# Patient Record
Sex: MALE | Race: WHITE | URBAN - METROPOLITAN AREA
[De-identification: names, ages, dates, MRNs, and addresses within clinical notes are randomized per-mention and may not be internally consistent; named-entity substitution may affect disease eponyms.]

---

## 2017-03-01 ENCOUNTER — HISTORICAL (OUTPATIENT)
Dept: LAB | Facility: HOSPITAL | Age: 82
End: 2017-03-01

## 2017-06-16 ENCOUNTER — HISTORICAL (OUTPATIENT)
Dept: ADMINISTRATIVE | Facility: HOSPITAL | Age: 82
End: 2017-06-16

## 2017-06-28 ENCOUNTER — HISTORICAL (OUTPATIENT)
Dept: ADMINISTRATIVE | Facility: HOSPITAL | Age: 82
End: 2017-06-28

## 2017-11-07 ENCOUNTER — HISTORICAL (OUTPATIENT)
Dept: LAB | Facility: HOSPITAL | Age: 82
End: 2017-11-07

## 2017-11-09 LAB — FINAL CULTURE: NORMAL

## 2018-02-02 ENCOUNTER — HISTORICAL (OUTPATIENT)
Dept: LAB | Facility: HOSPITAL | Age: 83
End: 2018-02-02

## 2018-02-04 LAB — FINAL CULTURE: NORMAL

## 2018-02-14 ENCOUNTER — HISTORICAL (OUTPATIENT)
Dept: LAB | Facility: HOSPITAL | Age: 83
End: 2018-02-14

## 2018-02-14 LAB
ABS NEUT (OLG): 3.3 X10(3)/MCL (ref 2.1–9.2)
ALBUMIN SERPL-MCNC: 3.5 GM/DL (ref 3.4–5)
ALBUMIN/GLOB SERPL: 1.1 RATIO (ref 1.1–2)
ALP SERPL-CCNC: 65 UNIT/L (ref 46–116)
ALT SERPL-CCNC: 19 UNIT/L (ref 12–78)
AST SERPL-CCNC: 15 UNIT/L (ref 15–37)
BASOPHILS NFR BLD AUTO: 1 % (ref 0–2)
BILIRUB SERPL-MCNC: 0.6 MG/DL (ref 0.2–1)
BILIRUBIN DIRECT+TOT PNL SERPL-MCNC: 0.2 MG/DL (ref 0–0.2)
BILIRUBIN DIRECT+TOT PNL SERPL-MCNC: 0.4 MG/DL (ref 0–0.8)
BUN SERPL-MCNC: 17.8 MG/DL (ref 7–18)
CALCIUM SERPL-MCNC: 9.5 MG/DL (ref 8.5–10.1)
CHLORIDE SERPL-SCNC: 109 MMOL/L (ref 98–107)
CO2 SERPL-SCNC: 29.3 MMOL/L (ref 21–32)
CREAT SERPL-MCNC: 1.04 MG/DL (ref 0.6–1.3)
CRP SERPL-MCNC: <0.2 MG/DL (ref 0–0.9)
EOSINOPHIL # BLD AUTO: 0.1 X10(3)/MCL
EOSINOPHIL NFR BLD AUTO: 2 %
ERYTHROCYTE [DISTWIDTH] IN BLOOD BY AUTOMATED COUNT: 14.1 % (ref 11.5–17)
GLOBULIN SER-MCNC: 3.2 GM/DL (ref 2.4–3.5)
GLUCOSE SERPL-MCNC: 112 MG/DL (ref 74–106)
HCT VFR BLD AUTO: 43.7 % (ref 42–52)
HGB BLD-MCNC: 14.6 GM/DL (ref 14–18)
LYMPHOCYTES # BLD AUTO: 0.9 X10(3)/MCL
LYMPHOCYTES NFR BLD AUTO: 18 % (ref 13–40)
MCH RBC QN AUTO: 31.2 PG (ref 27–31)
MCHC RBC AUTO-ENTMCNC: 33.3 GM/DL (ref 33–36)
MCV RBC AUTO: 93.5 FL (ref 80–94)
MONOCYTES # BLD AUTO: 0.3 X10(3)/MCL
MONOCYTES NFR BLD AUTO: 7 % (ref 2–11)
NEUTROPHILS # BLD AUTO: 3.3 X10(3)/MCL (ref 2.1–9.2)
NEUTROPHILS NFR BLD AUTO: 72 % (ref 47–80)
PLATELET # BLD AUTO: 177 X10(3)/MCL (ref 130–400)
PMV BLD AUTO: 8.5 FL (ref 7.4–10.4)
POTASSIUM SERPL-SCNC: 4.3 MMOL/L (ref 3.5–5.1)
PROT SERPL-MCNC: 6.7 GM/DL (ref 6.4–8.2)
RBC # BLD AUTO: 4.68 X10(6)/MCL (ref 4.7–6.1)
SODIUM SERPL-SCNC: 143 MMOL/L (ref 136–145)
TSH SERPL-ACNC: 2.1 MIU/ML (ref 0.36–3.74)
WBC # SPEC AUTO: 4.7 X10(3)/MCL (ref 4.5–11.5)

## 2018-03-13 ENCOUNTER — HISTORICAL (OUTPATIENT)
Dept: LAB | Facility: HOSPITAL | Age: 83
End: 2018-03-13

## 2018-03-13 LAB
ALBUMIN SERPL-MCNC: 3.4 GM/DL (ref 3.4–5)
ALP SERPL-CCNC: 70 UNIT/L (ref 46–116)
ALT SERPL-CCNC: 17 UNIT/L (ref 12–78)
AST SERPL-CCNC: 16 UNIT/L (ref 15–37)
BILIRUB SERPL-MCNC: 0.6 MG/DL (ref 0.2–1)
BILIRUBIN DIRECT+TOT PNL SERPL-MCNC: 0.16 MG/DL (ref 0–0.2)
BILIRUBIN DIRECT+TOT PNL SERPL-MCNC: 0.41 MG/DL (ref 0–0.8)
BUN SERPL-MCNC: 9.8 MG/DL (ref 7–18)
CALCIUM SERPL-MCNC: 9.3 MG/DL (ref 8.5–10.1)
CHLORIDE SERPL-SCNC: 107 MMOL/L (ref 98–107)
CHOLEST SERPL-MCNC: 149 MG/DL (ref 0–200)
CHOLEST/HDLC SERPL: 1.8 {RATIO} (ref 0–5)
CO2 SERPL-SCNC: 29.4 MMOL/L (ref 21–32)
CREAT SERPL-MCNC: 0.94 MG/DL (ref 0.6–1.3)
CREAT/UREA NIT SERPL: 10
ERYTHROCYTE [DISTWIDTH] IN BLOOD BY AUTOMATED COUNT: 13.8 % (ref 11.5–17)
EST. AVERAGE GLUCOSE BLD GHB EST-MCNC: 117 MG/DL
FT4I SERPL CALC-MCNC: 2.24
GLUCOSE SERPL-MCNC: 107 MG/DL (ref 74–106)
HBA1C MFR BLD: 5.7 % (ref 4.5–6.2)
HCT VFR BLD AUTO: 47.1 % (ref 42–52)
HDLC SERPL-MCNC: 85 MG/DL (ref 40–60)
HGB BLD-MCNC: 15.2 GM/DL (ref 14–18)
LDLC SERPL CALC-MCNC: 54 MG/DL (ref 0–129)
MCH RBC QN AUTO: 30.1 PG (ref 27–31)
MCHC RBC AUTO-ENTMCNC: 32.3 GM/DL (ref 33–36)
MCV RBC AUTO: 93.4 FL (ref 80–94)
PLATELET # BLD AUTO: 182 X10(3)/MCL (ref 130–400)
PMV BLD AUTO: 9.5 FL (ref 7.4–10.4)
POTASSIUM SERPL-SCNC: 4.1 MMOL/L (ref 3.5–5.1)
PROT SERPL-MCNC: 6.9 GM/DL (ref 6.4–8.2)
PSA SERPL-MCNC: <0.01 NG/ML (ref 0–4)
RBC # BLD AUTO: 5.05 X10(6)/MCL (ref 4.7–6.1)
SODIUM SERPL-SCNC: 142 MMOL/L (ref 136–145)
T3RU NFR SERPL: 34 % (ref 31–39)
T4 SERPL-MCNC: 6.6 MCG/DL (ref 4.7–13.3)
TRIGL SERPL-MCNC: 50 MG/DL
TSH SERPL-ACNC: 2.32 MIU/ML (ref 0.36–3.74)
VLDLC SERPL CALC-MCNC: 10 MG/DL
WBC # SPEC AUTO: 5.5 X10(3)/MCL (ref 4.5–11.5)

## 2018-03-22 ENCOUNTER — HISTORICAL (OUTPATIENT)
Dept: ENDOSCOPY | Facility: HOSPITAL | Age: 83
End: 2018-03-22

## 2018-10-18 ENCOUNTER — HISTORICAL (OUTPATIENT)
Dept: RADIOLOGY | Facility: HOSPITAL | Age: 83
End: 2018-10-18

## 2018-10-18 LAB
ALBUMIN SERPL-MCNC: 3 GM/DL (ref 3.4–5)
ALBUMIN/GLOB SERPL: 0.8 RATIO (ref 1.1–2)
ALP SERPL-CCNC: 108 UNIT/L (ref 46–116)
ALT SERPL-CCNC: 51 UNIT/L (ref 12–78)
AMYLASE SERPL-CCNC: 260 UNIT/L (ref 25–115)
AST SERPL-CCNC: 71 UNIT/L (ref 15–37)
BILIRUB SERPL-MCNC: 1.9 MG/DL (ref 0.2–1)
BILIRUBIN DIRECT+TOT PNL SERPL-MCNC: 0.78 MG/DL (ref 0–0.8)
BILIRUBIN DIRECT+TOT PNL SERPL-MCNC: 1.12 MG/DL (ref 0–0.2)
BUN SERPL-MCNC: 16.1 MG/DL (ref 7–18)
CALCIUM SERPL-MCNC: 8.6 MG/DL (ref 8.5–10.1)
CHLORIDE SERPL-SCNC: 102 MMOL/L (ref 98–107)
CO2 SERPL-SCNC: 27.7 MMOL/L (ref 21–32)
CREAT SERPL-MCNC: 1.04 MG/DL (ref 0.6–1.3)
ERYTHROCYTE [DISTWIDTH] IN BLOOD BY AUTOMATED COUNT: 13.7 % (ref 11.5–17)
GLOBULIN SER-MCNC: 3.6 GM/DL (ref 2.4–3.5)
GLUCOSE SERPL-MCNC: 114 MG/DL (ref 74–106)
HCT VFR BLD AUTO: 42.6 % (ref 42–52)
HGB BLD-MCNC: 14.2 GM/DL (ref 14–18)
MCH RBC QN AUTO: 30.6 PG (ref 27–31)
MCHC RBC AUTO-ENTMCNC: 33.3 GM/DL (ref 33–36)
MCV RBC AUTO: 91.8 FL (ref 80–94)
PLATELET # BLD AUTO: 195 X10(3)/MCL (ref 130–400)
PMV BLD AUTO: 11.4 FL (ref 9.4–12.4)
POTASSIUM SERPL-SCNC: 3.6 MMOL/L (ref 3.5–5.1)
PROT SERPL-MCNC: 6.6 GM/DL (ref 6.4–8.2)
RBC # BLD AUTO: 4.64 X10(6)/MCL (ref 4.7–6.1)
SODIUM SERPL-SCNC: 140 MMOL/L (ref 136–145)
WBC # SPEC AUTO: 9.4 X10(3)/MCL (ref 4.5–11.5)

## 2019-04-17 ENCOUNTER — HISTORICAL (OUTPATIENT)
Dept: ENDOSCOPY | Facility: HOSPITAL | Age: 84
End: 2019-04-17

## 2019-05-01 ENCOUNTER — HISTORICAL (OUTPATIENT)
Dept: LAB | Facility: HOSPITAL | Age: 84
End: 2019-05-01

## 2019-05-01 LAB
ABS NEUT (OLG): 2.75 X10(3)/MCL (ref 2.1–9.2)
ALBUMIN SERPL-MCNC: 3.2 GM/DL (ref 3.4–5)
ALP SERPL-CCNC: 74 UNIT/L (ref 46–116)
ALT SERPL-CCNC: 14 UNIT/L (ref 12–78)
AST SERPL-CCNC: 17 UNIT/L (ref 15–37)
BILIRUB SERPL-MCNC: 0.7 MG/DL (ref 0.2–1)
BILIRUBIN DIRECT+TOT PNL SERPL-MCNC: 0.19 MG/DL (ref 0–0.2)
BILIRUBIN DIRECT+TOT PNL SERPL-MCNC: 0.51 MG/DL (ref 0–0.8)
BUN SERPL-MCNC: 16.8 MG/DL (ref 7–18)
CALCIUM SERPL-MCNC: 9.1 MG/DL (ref 8.5–10.1)
CHLORIDE SERPL-SCNC: 105 MMOL/L (ref 98–107)
CHOLEST SERPL-MCNC: 167 MG/DL (ref 0–200)
CHOLEST/HDLC SERPL: 2.8 {RATIO} (ref 0–5)
CO2 SERPL-SCNC: 31.9 MMOL/L (ref 21–32)
CREAT SERPL-MCNC: 0.98 MG/DL (ref 0.6–1.3)
CREAT/UREA NIT SERPL: 17
ERYTHROCYTE [DISTWIDTH] IN BLOOD BY AUTOMATED COUNT: 14.3 % (ref 11.5–17)
FT4I SERPL CALC-MCNC: 2.07
GLUCOSE SERPL-MCNC: 99 MG/DL (ref 74–106)
HCT VFR BLD AUTO: 41 % (ref 42–52)
HDLC SERPL-MCNC: 59 MG/DL (ref 40–60)
HGB BLD-MCNC: 13.9 GM/DL (ref 14–18)
LDLC SERPL CALC-MCNC: 102 MG/DL (ref 0–129)
LYMPHOCYTES NFR BLD MANUAL: NORMAL % (ref 13–40)
MCH RBC QN AUTO: 31 PG (ref 27–31)
MCHC RBC AUTO-ENTMCNC: 33.9 GM/DL (ref 33–36)
MCV RBC AUTO: 91.3 FL (ref 80–94)
PLATELET # BLD AUTO: 179 X10(3)/MCL (ref 130–400)
PMV BLD AUTO: 10.3 FL (ref 9.4–12.4)
POTASSIUM SERPL-SCNC: 4.6 MMOL/L (ref 3.5–5.1)
PROT SERPL-MCNC: 6.6 GM/DL (ref 6.4–8.2)
PSA SERPL-MCNC: <0.01 NG/ML (ref 0–4)
RBC # BLD AUTO: 4.49 X10(6)/MCL (ref 4.7–6.1)
SODIUM SERPL-SCNC: 141 MMOL/L (ref 136–145)
T3RU NFR SERPL: 34 % (ref 31–39)
T4 SERPL-MCNC: 6.1 MCG/DL (ref 4.7–13.3)
TRIGL SERPL-MCNC: 31 MG/DL
TSH SERPL-ACNC: 2.19 MIU/ML (ref 0.36–3.74)
VLDLC SERPL CALC-MCNC: 6 MG/DL
WBC # SPEC AUTO: 4.2 X10(3)/MCL (ref 4.5–11.5)

## 2019-08-01 ENCOUNTER — HISTORICAL (OUTPATIENT)
Dept: LAB | Facility: HOSPITAL | Age: 84
End: 2019-08-01

## 2020-04-24 ENCOUNTER — HISTORICAL (OUTPATIENT)
Dept: RADIOLOGY | Facility: HOSPITAL | Age: 85
End: 2020-04-24

## 2020-04-24 LAB
BUN SERPL-MCNC: 14.8 MG/DL (ref 7–18)
CALCIUM SERPL-MCNC: 9.3 MG/DL (ref 8.5–10.1)
CHLORIDE SERPL-SCNC: 106 MMOL/L (ref 98–107)
CO2 SERPL-SCNC: 33.7 MMOL/L (ref 21–32)
CREAT SERPL-MCNC: 0.84 MG/DL (ref 0.6–1.3)
CREAT/UREA NIT SERPL: 18
ERYTHROCYTE [DISTWIDTH] IN BLOOD BY AUTOMATED COUNT: 14.5 % (ref 11.5–17)
GLUCOSE SERPL-MCNC: 91 MG/DL (ref 74–106)
HCT VFR BLD AUTO: 38.5 % (ref 42–52)
HGB BLD-MCNC: 12.4 GM/DL (ref 14–18)
MCH RBC QN AUTO: 30.5 PG (ref 27–31)
MCHC RBC AUTO-ENTMCNC: 32.2 GM/DL (ref 33–36)
MCV RBC AUTO: 94.6 FL (ref 80–94)
PLATELET # BLD AUTO: 211 X10(3)/MCL (ref 130–400)
PMV BLD AUTO: 10.8 FL (ref 9.4–12.4)
POTASSIUM SERPL-SCNC: 4.5 MMOL/L (ref 3.5–5.1)
RBC # BLD AUTO: 4.07 X10(6)/MCL (ref 4.7–6.1)
SODIUM SERPL-SCNC: 145 MMOL/L (ref 136–145)
WBC # SPEC AUTO: 4.4 X10(3)/MCL (ref 4.5–11.5)

## 2020-05-21 ENCOUNTER — HISTORICAL (OUTPATIENT)
Dept: ADMINISTRATIVE | Facility: HOSPITAL | Age: 85
End: 2020-05-21

## 2020-05-21 LAB
ABS NEUT (OLG): 14.65 X10(3)/MCL (ref 2.1–9.2)
BASOPHILS # BLD AUTO: 0.03 X10(3)/MCL (ref 0–0.2)
BASOPHILS NFR BLD AUTO: 0.2 % (ref 0–0.9)
BUN SERPL-MCNC: 27.5 MG/DL (ref 8.4–25.7)
CALCIUM SERPL-MCNC: 7.7 MG/DL (ref 8.8–10)
CHLORIDE SERPL-SCNC: 106 MMOL/L (ref 98–107)
CO2 SERPL-SCNC: 31 MMOL/L (ref 23–31)
CREAT SERPL-MCNC: 0.57 MG/DL (ref 0.72–1.25)
CREAT/UREA NIT SERPL: 48
EOSINOPHIL # BLD AUTO: 0.01 X10(3)/MCL (ref 0–0.9)
EOSINOPHIL NFR BLD AUTO: 0.1 % (ref 0–6.5)
ERYTHROCYTE [DISTWIDTH] IN BLOOD BY AUTOMATED COUNT: 14.7 % (ref 11.5–17)
GLUCOSE SERPL-MCNC: 210 MG/DL (ref 82–115)
HCO3 UR-SCNC: 34.3 MMOL/L (ref 22–26)
HCT VFR BLD AUTO: 22.5 % (ref 42–52)
HGB BLD-MCNC: 7.3 GM/DL (ref 14–18)
IMM GRANULOCYTES # BLD AUTO: 0.11 10*3/UL (ref 0–0.02)
IMM GRANULOCYTES NFR BLD AUTO: 0.7 % (ref 0–0.43)
LYMPHOCYTES # BLD AUTO: 0.42 X10(3)/MCL (ref 0.6–4.6)
LYMPHOCYTES NFR BLD AUTO: 2.7 % (ref 16.2–38.3)
MCH RBC QN AUTO: 29.1 PG (ref 27–31)
MCHC RBC AUTO-ENTMCNC: 32.4 GM/DL (ref 33–36)
MCV RBC AUTO: 89.6 FL (ref 80–94)
MONOCYTES # BLD AUTO: 0.5 X10(3)/MCL (ref 0.1–1.3)
MONOCYTES NFR BLD AUTO: 3.2 % (ref 4.7–11.3)
NEUTROPHILS # BLD AUTO: 14.65 X10(3)/MCL (ref 2.1–9.2)
NEUTROPHILS NFR BLD AUTO: 93.1 % (ref 49.1–73.4)
NRBC BLD AUTO-RTO: 0 % (ref 0–0.2)
PCO2 BLDA: 41 MMHG (ref 35–45)
PH SMN: 7.53 [PH] (ref 7.35–7.45)
PLATELET # BLD AUTO: 380 X10(3)/MCL (ref 130–400)
PMV BLD AUTO: 10.1 FL (ref 7.4–10.4)
PO2 BLDA: 91 MMHG (ref 50–100)
POC BE: 10.6 (ref -2–3)
POC SATURATED O2: 98 % (ref 96–97)
POTASSIUM SERPL-SCNC: 3.7 MMOL/L (ref 3.5–5.1)
PREALB SERPL-MCNC: 5.5 MG/DL (ref 16–42)
RBC # BLD AUTO: 2.51 X10(6)/MCL (ref 4.7–6.1)
SETTING 1 ABG: ABNORMAL
SETTING 2 ABG: ABNORMAL
SODIUM SERPL-SCNC: 142 MMOL/L (ref 136–145)
WBC # SPEC AUTO: 15.7 X10(3)/MCL (ref 4.5–11.5)

## 2020-05-22 LAB
ABS NEUT (OLG): 13.66 X10(3)/MCL (ref 2.1–9.2)
ALBUMIN SERPL-MCNC: 1.3 GM/DL (ref 3.4–4.8)
APPEARANCE, UA: ABNORMAL
BACTERIA SPEC CULT: ABNORMAL /HPF
BILIRUB UR QL STRIP: NEGATIVE
BUN SERPL-MCNC: 24.4 MG/DL (ref 8.4–25.7)
CALCIUM SERPL-MCNC: 8.4 MG/DL (ref 8.8–10)
CHLORIDE SERPL-SCNC: 107 MMOL/L (ref 98–107)
CO2 SERPL-SCNC: 32 MMOL/L (ref 23–31)
COLOR UR: YELLOW
CREAT SERPL-MCNC: 0.58 MG/DL (ref 0.72–1.25)
CROSSMATCH INTERPRETATION: NORMAL
CROSSMATCH INTERPRETATION: NORMAL
ERYTHROCYTE [DISTWIDTH] IN BLOOD BY AUTOMATED COUNT: 15 % (ref 11.5–17)
GLUCOSE (UA): NEGATIVE
GLUCOSE SERPL-MCNC: 179 MG/DL (ref 82–115)
GROUP & RH: NORMAL
HCT VFR BLD AUTO: 21.7 % (ref 42–52)
HGB BLD-MCNC: 6.9 GM/DL (ref 14–18)
HGB UR QL STRIP: ABNORMAL
KETONES UR QL STRIP: NEGATIVE
LEUKOCYTE ESTERASE UR QL STRIP: ABNORMAL
MAGNESIUM SERPL-MCNC: 2.25 MG/DL (ref 1.6–2.6)
MCH RBC QN AUTO: 29 PG (ref 27–31)
MCHC RBC AUTO-ENTMCNC: 31.8 GM/DL (ref 33–36)
MCV RBC AUTO: 91.2 FL (ref 80–94)
NITRITE UR QL STRIP: POSITIVE
NRBC BLD AUTO-RTO: 0 % (ref 0–0.2)
PH UR STRIP: 6.5 [PH] (ref 5–7)
PHOSPHATE SERPL-MCNC: 2 MG/DL (ref 2.3–4.7)
PLATELET # BLD AUTO: 419 X10(3)/MCL (ref 130–400)
PMV BLD AUTO: 10.3 FL (ref 7.4–10.4)
POTASSIUM SERPL-SCNC: 3.8 MMOL/L (ref 3.5–5.1)
PRODUCT READY: NORMAL
PRODUCT READY: NORMAL
PROT UR QL STRIP: NEGATIVE
RBC # BLD AUTO: 2.38 X10(6)/MCL (ref 4.7–6.1)
RBC #/AREA URNS HPF: ABNORMAL /HPF
SODIUM SERPL-SCNC: 143 MMOL/L (ref 136–145)
SP GR UR STRIP: 1.02 (ref 1–1.03)
SQUAMOUS EPITHELIAL, UA: ABNORMAL /LPF
TRANSFUSION ORDER: NORMAL
TRANSFUSION ORDER: NORMAL
UROBILINOGEN UR STRIP-ACNC: NEGATIVE
WBC # SPEC AUTO: 14.7 X10(3)/MCL (ref 4.5–11.5)
WBC #/AREA URNS HPF: ABNORMAL /HPF

## 2020-05-23 LAB
ABS NEUT (OLG): 10.8 X10(3)/MCL (ref 2.1–9.2)
ALBUMIN SERPL-MCNC: 1.4 GM/DL (ref 3.4–4.8)
BUN SERPL-MCNC: 29.9 MG/DL (ref 8.4–25.7)
CALCIUM SERPL-MCNC: 8.7 MG/DL (ref 8.8–10)
CHLORIDE SERPL-SCNC: 104 MMOL/L (ref 98–107)
CO2 SERPL-SCNC: 35 MMOL/L (ref 23–31)
CREAT SERPL-MCNC: 0.56 MG/DL (ref 0.72–1.25)
ERYTHROCYTE [DISTWIDTH] IN BLOOD BY AUTOMATED COUNT: 14.7 % (ref 11.5–17)
GLUCOSE SERPL-MCNC: 182 MG/DL (ref 82–115)
GRAM STN SPEC: NORMAL
HCT VFR BLD AUTO: 26.5 % (ref 42–52)
HGB BLD-MCNC: 8.8 GM/DL (ref 14–18)
MAGNESIUM SERPL-MCNC: 2.23 MG/DL (ref 1.6–2.6)
MCH RBC QN AUTO: 29.9 PG (ref 27–31)
MCHC RBC AUTO-ENTMCNC: 33.2 GM/DL (ref 33–36)
MCV RBC AUTO: 90.1 FL (ref 80–94)
NRBC BLD AUTO-RTO: 0 % (ref 0–0.2)
PHOSPHATE SERPL-MCNC: 2.7 MG/DL (ref 2.3–4.7)
PLATELET # BLD AUTO: 433 X10(3)/MCL (ref 130–400)
PMV BLD AUTO: 10.3 FL (ref 7.4–10.4)
POTASSIUM SERPL-SCNC: 4 MMOL/L (ref 3.5–5.1)
RBC # BLD AUTO: 2.94 X10(6)/MCL (ref 4.7–6.1)
SODIUM SERPL-SCNC: 142 MMOL/L (ref 136–145)
WBC # SPEC AUTO: 11.8 X10(3)/MCL (ref 4.5–11.5)

## 2020-05-24 LAB
ABS NEUT (OLG): 14.18 X10(3)/MCL (ref 2.1–9.2)
ALBUMIN SERPL-MCNC: 1.3 GM/DL (ref 3.4–4.8)
BUN SERPL-MCNC: 49.5 MG/DL (ref 8.4–25.7)
CALCIUM SERPL-MCNC: 8.5 MG/DL (ref 8.8–10)
CHLORIDE SERPL-SCNC: 107 MMOL/L (ref 98–107)
CO2 SERPL-SCNC: 31 MMOL/L (ref 23–31)
CREAT SERPL-MCNC: 0.59 MG/DL (ref 0.72–1.25)
ERYTHROCYTE [DISTWIDTH] IN BLOOD BY AUTOMATED COUNT: 15.2 % (ref 11.5–17)
FINAL CULTURE: NORMAL
GLUCOSE SERPL-MCNC: 248 MG/DL (ref 82–115)
HCT VFR BLD AUTO: 21.4 % (ref 42–52)
HGB BLD-MCNC: 7 GM/DL (ref 14–18)
MAGNESIUM SERPL-MCNC: 2.17 MG/DL (ref 1.6–2.6)
MCH RBC QN AUTO: 30.3 PG (ref 27–31)
MCHC RBC AUTO-ENTMCNC: 32.7 GM/DL (ref 33–36)
MCV RBC AUTO: 92.6 FL (ref 80–94)
NRBC BLD AUTO-RTO: 0 % (ref 0–0.2)
PHOSPHATE SERPL-MCNC: 2.4 MG/DL (ref 2.3–4.7)
PLATELET # BLD AUTO: 488 X10(3)/MCL (ref 130–400)
PMV BLD AUTO: 11 FL (ref 7.4–10.4)
POTASSIUM SERPL-SCNC: 4.2 MMOL/L (ref 3.5–5.1)
RBC # BLD AUTO: 2.31 X10(6)/MCL (ref 4.7–6.1)
SODIUM SERPL-SCNC: 144 MMOL/L (ref 136–145)
WBC # SPEC AUTO: 14.9 X10(3)/MCL (ref 4.5–11.5)

## 2020-05-25 LAB
ABS NEUT (OLG): 15.05 X10(3)/MCL (ref 2.1–9.2)
ALBUMIN SERPL-MCNC: 1.3 GM/DL (ref 3.4–4.8)
BASOPHILS # BLD AUTO: 0.02 X10(3)/MCL (ref 0–0.2)
BASOPHILS NFR BLD AUTO: 0.1 % (ref 0–0.9)
BUN SERPL-MCNC: 62.8 MG/DL (ref 8.4–25.7)
CALCIUM SERPL-MCNC: 8.4 MG/DL (ref 8.8–10)
CHLORIDE SERPL-SCNC: 111 MMOL/L (ref 98–107)
CO2 SERPL-SCNC: 30 MMOL/L (ref 23–31)
CREAT SERPL-MCNC: 0.78 MG/DL (ref 0.72–1.25)
CROSSMATCH INTERPRETATION: NORMAL
EOSINOPHIL # BLD AUTO: 0 X10(3)/MCL (ref 0–0.9)
EOSINOPHIL NFR BLD AUTO: 0 % (ref 0–6.5)
ERYTHROCYTE [DISTWIDTH] IN BLOOD BY AUTOMATED COUNT: 15.8 % (ref 11.5–17)
GLUCOSE SERPL-MCNC: 280 MG/DL (ref 82–115)
GROUP & RH: NORMAL
HCT VFR BLD AUTO: 22.2 % (ref 42–52)
HGB BLD-MCNC: 7.5 GM/DL (ref 14–18)
IMM GRANULOCYTES # BLD AUTO: 0.12 10*3/UL (ref 0–0.02)
IMM GRANULOCYTES NFR BLD AUTO: 0.7 % (ref 0–0.43)
LYMPHOCYTES # BLD AUTO: 0.65 X10(3)/MCL (ref 0.6–4.6)
LYMPHOCYTES NFR BLD AUTO: 4 % (ref 16.2–38.3)
MAGNESIUM SERPL-MCNC: 2.37 MG/DL (ref 1.6–2.6)
MCH RBC QN AUTO: 30.4 PG (ref 27–31)
MCHC RBC AUTO-ENTMCNC: 33.8 GM/DL (ref 33–36)
MCV RBC AUTO: 89.9 FL (ref 80–94)
MONOCYTES # BLD AUTO: 0.51 X10(3)/MCL (ref 0.1–1.3)
MONOCYTES NFR BLD AUTO: 3.1 % (ref 4.7–11.3)
NEUTROPHILS # BLD AUTO: 15.05 X10(3)/MCL (ref 2.1–9.2)
NEUTROPHILS NFR BLD AUTO: 92.1 % (ref 49.1–73.4)
NRBC BLD AUTO-RTO: 0 % (ref 0–0.2)
PHOSPHATE SERPL-MCNC: 2.3 MG/DL (ref 2.3–4.7)
PLATELET # BLD AUTO: 376 X10(3)/MCL (ref 130–400)
PMV BLD AUTO: 10.9 FL (ref 7.4–10.4)
POTASSIUM SERPL-SCNC: 4.3 MMOL/L (ref 3.5–5.1)
PRODUCT READY: NORMAL
RBC # BLD AUTO: 2.47 X10(6)/MCL (ref 4.7–6.1)
SODIUM SERPL-SCNC: 146 MMOL/L (ref 136–145)
TRANSFUSION ORDER: NORMAL
VANCOMYCIN TROUGH SERPL-MCNC: 9.7 UG/ML (ref 5–10)
WBC # SPEC AUTO: 16.4 X10(3)/MCL (ref 4.5–11.5)

## 2020-05-26 LAB
ABS NEUT (OLG): 13.83 X10(3)/MCL (ref 2.1–9.2)
ALBUMIN SERPL-MCNC: 1.3 GM/DL (ref 3.4–4.8)
BASOPHILS # BLD AUTO: 0.01 X10(3)/MCL (ref 0–0.2)
BASOPHILS NFR BLD AUTO: 0.1 % (ref 0–0.9)
BUN SERPL-MCNC: 58.1 MG/DL (ref 8.4–25.7)
CALCIUM SERPL-MCNC: 8 MG/DL (ref 8.8–10)
CHLORIDE SERPL-SCNC: 113 MMOL/L (ref 98–107)
CO2 SERPL-SCNC: 32 MMOL/L (ref 23–31)
CREAT SERPL-MCNC: 0.72 MG/DL (ref 0.72–1.25)
EOSINOPHIL # BLD AUTO: 0 X10(3)/MCL (ref 0–0.9)
EOSINOPHIL NFR BLD AUTO: 0 % (ref 0–6.5)
ERYTHROCYTE [DISTWIDTH] IN BLOOD BY AUTOMATED COUNT: 15.7 % (ref 11.5–17)
EST. AVERAGE GLUCOSE BLD GHB EST-MCNC: 108.3 MG/DL
GLUCOSE SERPL-MCNC: 247 MG/DL (ref 82–115)
HBA1C MFR BLD: 5.4 %
HCT VFR BLD AUTO: 26 % (ref 42–52)
HGB BLD-MCNC: 8.5 GM/DL (ref 14–18)
IMM GRANULOCYTES # BLD AUTO: 0.11 10*3/UL (ref 0–0.02)
IMM GRANULOCYTES NFR BLD AUTO: 0.7 % (ref 0–0.43)
LYMPHOCYTES # BLD AUTO: 0.49 X10(3)/MCL (ref 0.6–4.6)
LYMPHOCYTES NFR BLD AUTO: 3.3 % (ref 16.2–38.3)
MAGNESIUM SERPL-MCNC: 2.54 MG/DL (ref 1.6–2.6)
MCH RBC QN AUTO: 29.5 PG (ref 27–31)
MCHC RBC AUTO-ENTMCNC: 32.7 GM/DL (ref 33–36)
MCV RBC AUTO: 90.3 FL (ref 80–94)
MONOCYTES # BLD AUTO: 0.49 X10(3)/MCL (ref 0.1–1.3)
MONOCYTES NFR BLD AUTO: 3.3 % (ref 4.7–11.3)
NEUTROPHILS # BLD AUTO: 13.83 X10(3)/MCL (ref 2.1–9.2)
NEUTROPHILS NFR BLD AUTO: 92.6 % (ref 49.1–73.4)
NRBC BLD AUTO-RTO: 0 % (ref 0–0.2)
PHOSPHATE SERPL-MCNC: 2.5 MG/DL (ref 2.3–4.7)
PLATELET # BLD AUTO: 349 X10(3)/MCL (ref 130–400)
PMV BLD AUTO: 11.2 FL (ref 7.4–10.4)
POTASSIUM SERPL-SCNC: 4 MMOL/L (ref 3.5–5.1)
RBC # BLD AUTO: 2.88 X10(6)/MCL (ref 4.7–6.1)
SODIUM SERPL-SCNC: 150 MMOL/L (ref 136–145)
WBC # SPEC AUTO: 14.9 X10(3)/MCL (ref 4.5–11.5)

## 2020-05-27 LAB
ABS NEUT (OLG): 11.04 X10(3)/MCL (ref 2.1–9.2)
ALBUMIN SERPL-MCNC: 1.2 GM/DL (ref 3.4–4.8)
BUN SERPL-MCNC: 49 MG/DL (ref 8.4–25.7)
CALCIUM SERPL-MCNC: 8.5 MG/DL (ref 8.8–10)
CHLORIDE SERPL-SCNC: 114 MMOL/L (ref 98–107)
CO2 SERPL-SCNC: 35 MMOL/L (ref 23–31)
CREAT SERPL-MCNC: 0.64 MG/DL (ref 0.72–1.25)
ERYTHROCYTE [DISTWIDTH] IN BLOOD BY AUTOMATED COUNT: 15.6 % (ref 11.5–17)
GLUCOSE SERPL-MCNC: 174 MG/DL (ref 82–115)
HCT VFR BLD AUTO: 24.5 % (ref 42–52)
HGB BLD-MCNC: 7.9 GM/DL (ref 14–18)
MAGNESIUM SERPL-MCNC: 2.51 MG/DL (ref 1.6–2.6)
MCH RBC QN AUTO: 30.3 PG (ref 27–31)
MCHC RBC AUTO-ENTMCNC: 32.2 GM/DL (ref 33–36)
MCV RBC AUTO: 93.9 FL (ref 80–94)
NRBC BLD AUTO-RTO: 0 % (ref 0–0.2)
PHOSPHATE SERPL-MCNC: 2.4 MG/DL (ref 2.3–4.7)
PLATELET # BLD AUTO: 286 X10(3)/MCL (ref 130–400)
PMV BLD AUTO: 10.8 FL (ref 7.4–10.4)
POTASSIUM SERPL-SCNC: 3.5 MMOL/L (ref 3.5–5.1)
RBC # BLD AUTO: 2.61 X10(6)/MCL (ref 4.7–6.1)
SODIUM SERPL-SCNC: 150 MMOL/L (ref 136–145)
WBC # SPEC AUTO: 11.8 X10(3)/MCL (ref 4.5–11.5)

## 2020-05-28 LAB
ABS NEUT (OLG): 9.04 X10(3)/MCL (ref 2.1–9.2)
BASOPHILS # BLD AUTO: 0.01 X10(3)/MCL (ref 0–0.2)
BASOPHILS NFR BLD AUTO: 0.1 % (ref 0–0.9)
BUN SERPL-MCNC: 37.1 MG/DL (ref 8.4–25.7)
CALCIUM SERPL-MCNC: 8 MG/DL (ref 8.8–10)
CHLORIDE SERPL-SCNC: 114 MMOL/L (ref 98–107)
CO2 SERPL-SCNC: 32 MMOL/L (ref 23–31)
CREAT SERPL-MCNC: 0.58 MG/DL (ref 0.72–1.25)
CREAT/UREA NIT SERPL: 64
EOSINOPHIL # BLD AUTO: 0.07 X10(3)/MCL (ref 0–0.9)
EOSINOPHIL NFR BLD AUTO: 0.7 % (ref 0–6.5)
ERYTHROCYTE [DISTWIDTH] IN BLOOD BY AUTOMATED COUNT: 15.5 % (ref 11.5–17)
GLUCOSE SERPL-MCNC: 170 MG/DL (ref 82–115)
HCT VFR BLD AUTO: 25.3 % (ref 42–52)
HGB BLD-MCNC: 8.1 GM/DL (ref 14–18)
IMM GRANULOCYTES # BLD AUTO: 0.07 10*3/UL (ref 0–0.02)
IMM GRANULOCYTES NFR BLD AUTO: 0.7 % (ref 0–0.43)
LYMPHOCYTES # BLD AUTO: 0.41 X10(3)/MCL (ref 0.6–4.6)
LYMPHOCYTES NFR BLD AUTO: 4.1 % (ref 16.2–38.3)
MCH RBC QN AUTO: 30.2 PG (ref 27–31)
MCHC RBC AUTO-ENTMCNC: 32 GM/DL (ref 33–36)
MCV RBC AUTO: 94.4 FL (ref 80–94)
MONOCYTES # BLD AUTO: 0.3 X10(3)/MCL (ref 0.1–1.3)
MONOCYTES NFR BLD AUTO: 3 % (ref 4.7–11.3)
NEUTROPHILS # BLD AUTO: 9.04 X10(3)/MCL (ref 2.1–9.2)
NEUTROPHILS NFR BLD AUTO: 91.4 % (ref 49.1–73.4)
NRBC BLD AUTO-RTO: 0 % (ref 0–0.2)
PLATELET # BLD AUTO: 298 X10(3)/MCL (ref 130–400)
PMV BLD AUTO: 11.3 FL (ref 7.4–10.4)
POTASSIUM SERPL-SCNC: 3.5 MMOL/L (ref 3.5–5.1)
RBC # BLD AUTO: 2.68 X10(6)/MCL (ref 4.7–6.1)
SODIUM SERPL-SCNC: 150 MMOL/L (ref 136–145)
VANCOMYCIN TROUGH SERPL-MCNC: 15.2 UG/ML (ref 5–10)
WBC # SPEC AUTO: 9.9 X10(3)/MCL (ref 4.5–11.5)

## 2020-05-29 LAB
C DIFF INTERP: NEGATIVE
COLOR STL: ABNORMAL
CONSISTENCY STL: ABNORMAL
HEMOCCULT SP2 STL QL: POSITIVE

## 2020-05-30 LAB
ABS NEUT (OLG): 7.72 X10(3)/MCL (ref 2.1–9.2)
BASOPHILS # BLD AUTO: 0.01 X10(3)/MCL (ref 0–0.2)
BASOPHILS NFR BLD AUTO: 0.1 % (ref 0–0.9)
BUN SERPL-MCNC: 25 MG/DL (ref 8.4–25.7)
CALCIUM SERPL-MCNC: 7.5 MG/DL (ref 8.8–10)
CHLORIDE SERPL-SCNC: 114 MMOL/L (ref 98–107)
CO2 SERPL-SCNC: 29 MMOL/L (ref 23–31)
CREAT SERPL-MCNC: 0.5 MG/DL (ref 0.72–1.25)
CREAT/UREA NIT SERPL: 50
EOSINOPHIL # BLD AUTO: 0.23 X10(3)/MCL (ref 0–0.9)
EOSINOPHIL NFR BLD AUTO: 2.6 % (ref 0–6.5)
ERYTHROCYTE [DISTWIDTH] IN BLOOD BY AUTOMATED COUNT: 15.7 % (ref 11.5–17)
GLUCOSE SERPL-MCNC: 137 MG/DL (ref 82–115)
HCT VFR BLD AUTO: 24.1 % (ref 42–52)
HGB BLD-MCNC: 7.5 GM/DL (ref 14–18)
IMM GRANULOCYTES # BLD AUTO: 0.05 10*3/UL (ref 0–0.02)
IMM GRANULOCYTES NFR BLD AUTO: 0.6 % (ref 0–0.43)
LYMPHOCYTES # BLD AUTO: 0.41 X10(3)/MCL (ref 0.6–4.6)
LYMPHOCYTES NFR BLD AUTO: 4.7 % (ref 16.2–38.3)
MAGNESIUM SERPL-MCNC: 2.17 MG/DL (ref 1.6–2.6)
MCH RBC QN AUTO: 29.6 PG (ref 27–31)
MCHC RBC AUTO-ENTMCNC: 31.1 GM/DL (ref 33–36)
MCV RBC AUTO: 95.3 FL (ref 80–94)
MONOCYTES # BLD AUTO: 0.3 X10(3)/MCL (ref 0.1–1.3)
MONOCYTES NFR BLD AUTO: 3.4 % (ref 4.7–11.3)
NEUTROPHILS # BLD AUTO: 7.72 X10(3)/MCL (ref 2.1–9.2)
NEUTROPHILS NFR BLD AUTO: 88.6 % (ref 49.1–73.4)
NRBC BLD AUTO-RTO: 0 % (ref 0–0.2)
PLATELET # BLD AUTO: 309 X10(3)/MCL (ref 130–400)
PMV BLD AUTO: 11.3 FL (ref 7.4–10.4)
POTASSIUM SERPL-SCNC: 3.1 MMOL/L (ref 3.5–5.1)
RBC # BLD AUTO: 2.53 X10(6)/MCL (ref 4.7–6.1)
SODIUM SERPL-SCNC: 147 MMOL/L (ref 136–145)
WBC # SPEC AUTO: 8.7 X10(3)/MCL (ref 4.5–11.5)

## 2020-05-31 LAB
ABS NEUT (OLG): 9.8 X10(3)/MCL (ref 2.1–9.2)
BASOPHILS # BLD AUTO: 0.03 X10(3)/MCL (ref 0–0.2)
BASOPHILS NFR BLD AUTO: 0.3 % (ref 0–0.9)
BUN SERPL-MCNC: 18.6 MG/DL (ref 8.4–25.7)
CALCIUM SERPL-MCNC: 7.8 MG/DL (ref 8.8–10)
CHLORIDE SERPL-SCNC: 111 MMOL/L (ref 98–107)
CO2 SERPL-SCNC: 30 MMOL/L (ref 23–31)
CREAT SERPL-MCNC: 0.51 MG/DL (ref 0.72–1.25)
CREAT/UREA NIT SERPL: 36
EOSINOPHIL # BLD AUTO: 0.16 X10(3)/MCL (ref 0–0.9)
EOSINOPHIL NFR BLD AUTO: 1.5 % (ref 0–6.5)
ERYTHROCYTE [DISTWIDTH] IN BLOOD BY AUTOMATED COUNT: 15.8 % (ref 11.5–17)
GLUCOSE SERPL-MCNC: 129 MG/DL (ref 82–115)
HCT VFR BLD AUTO: 25.6 % (ref 42–52)
HGB BLD-MCNC: 8.1 GM/DL (ref 14–18)
IMM GRANULOCYTES # BLD AUTO: 0.08 10*3/UL (ref 0–0.02)
IMM GRANULOCYTES NFR BLD AUTO: 0.7 % (ref 0–0.43)
LYMPHOCYTES # BLD AUTO: 0.49 X10(3)/MCL (ref 0.6–4.6)
LYMPHOCYTES NFR BLD AUTO: 4.5 % (ref 16.2–38.3)
MAGNESIUM SERPL-MCNC: 2.06 MG/DL (ref 1.6–2.6)
MCH RBC QN AUTO: 29.5 PG (ref 27–31)
MCHC RBC AUTO-ENTMCNC: 31.6 GM/DL (ref 33–36)
MCV RBC AUTO: 93.1 FL (ref 80–94)
MONOCYTES # BLD AUTO: 0.31 X10(3)/MCL (ref 0.1–1.3)
MONOCYTES NFR BLD AUTO: 2.9 % (ref 4.7–11.3)
NEUTROPHILS # BLD AUTO: 9.8 X10(3)/MCL (ref 2.1–9.2)
NEUTROPHILS NFR BLD AUTO: 90.1 % (ref 49.1–73.4)
NRBC BLD AUTO-RTO: 0 % (ref 0–0.2)
PLATELET # BLD AUTO: 339 X10(3)/MCL (ref 130–400)
PMV BLD AUTO: 11.2 FL (ref 7.4–10.4)
POTASSIUM SERPL-SCNC: 3.4 MMOL/L (ref 3.5–5.1)
RBC # BLD AUTO: 2.75 X10(6)/MCL (ref 4.7–6.1)
SODIUM SERPL-SCNC: 145 MMOL/L (ref 136–145)
WBC # SPEC AUTO: 10.9 X10(3)/MCL (ref 4.5–11.5)

## 2020-06-01 LAB — VANCOMYCIN TROUGH SERPL-MCNC: 13.7 UG/ML (ref 5–10)

## 2020-06-02 LAB
ABS NEUT (OLG): 20.66 X10(3)/MCL (ref 2.1–9.2)
BASOPHILS # BLD AUTO: 0.02 X10(3)/MCL (ref 0–0.2)
BASOPHILS NFR BLD AUTO: 0.1 % (ref 0–0.9)
BUN SERPL-MCNC: 15.5 MG/DL (ref 8.4–25.7)
CALCIUM SERPL-MCNC: 7.8 MG/DL (ref 8.8–10)
CHLORIDE SERPL-SCNC: 107 MMOL/L (ref 98–107)
CO2 SERPL-SCNC: 31 MMOL/L (ref 23–31)
CREAT SERPL-MCNC: 0.49 MG/DL (ref 0.72–1.25)
CREAT/UREA NIT SERPL: 32
EOSINOPHIL # BLD AUTO: 0 X10(3)/MCL (ref 0–0.9)
EOSINOPHIL NFR BLD AUTO: 0 % (ref 0–6.5)
ERYTHROCYTE [DISTWIDTH] IN BLOOD BY AUTOMATED COUNT: 16.1 % (ref 11.5–17)
FINAL CULTURE: NORMAL
FINAL CULTURE: NORMAL
GLUCOSE SERPL-MCNC: 136 MG/DL (ref 82–115)
HCT VFR BLD AUTO: 25.5 % (ref 42–52)
HGB BLD-MCNC: 8 GM/DL (ref 14–18)
IMM GRANULOCYTES # BLD AUTO: 0.14 10*3/UL (ref 0–0.02)
IMM GRANULOCYTES NFR BLD AUTO: 0.6 % (ref 0–0.43)
LYMPHOCYTES # BLD AUTO: 0.37 X10(3)/MCL (ref 0.6–4.6)
LYMPHOCYTES NFR BLD AUTO: 1.7 % (ref 16.2–38.3)
MAGNESIUM SERPL-MCNC: 1.94 MG/DL (ref 1.6–2.6)
MCH RBC QN AUTO: 29.3 PG (ref 27–31)
MCHC RBC AUTO-ENTMCNC: 31.4 GM/DL (ref 33–36)
MCV RBC AUTO: 93.4 FL (ref 80–94)
MONOCYTES # BLD AUTO: 0.61 X10(3)/MCL (ref 0.1–1.3)
MONOCYTES NFR BLD AUTO: 2.8 % (ref 4.7–11.3)
NEUTROPHILS # BLD AUTO: 20.66 X10(3)/MCL (ref 2.1–9.2)
NEUTROPHILS NFR BLD AUTO: 94.8 % (ref 49.1–73.4)
NRBC BLD AUTO-RTO: 0 % (ref 0–0.2)
PLATELET # BLD AUTO: 299 X10(3)/MCL (ref 130–400)
PMV BLD AUTO: 10.7 FL (ref 7.4–10.4)
POTASSIUM SERPL-SCNC: 2.9 MMOL/L (ref 3.5–5.1)
RBC # BLD AUTO: 2.73 X10(6)/MCL (ref 4.7–6.1)
SODIUM SERPL-SCNC: 139 MMOL/L (ref 136–145)
WBC # SPEC AUTO: 21.8 X10(3)/MCL (ref 4.5–11.5)

## 2020-06-03 LAB
ABS NEUT (OLG): 17.38 X10(3)/MCL (ref 2.1–9.2)
BASOPHILS # BLD AUTO: 0.02 X10(3)/MCL (ref 0–0.2)
BASOPHILS NFR BLD AUTO: 0.1 % (ref 0–0.9)
BUN SERPL-MCNC: 17.6 MG/DL (ref 8.4–25.7)
CALCIUM SERPL-MCNC: 7.6 MG/DL (ref 8.8–10)
CHLORIDE SERPL-SCNC: 109 MMOL/L (ref 98–107)
CO2 SERPL-SCNC: 29 MMOL/L (ref 23–31)
CREAT SERPL-MCNC: 0.42 MG/DL (ref 0.72–1.25)
CREAT/UREA NIT SERPL: 42
EOSINOPHIL # BLD AUTO: 0.03 X10(3)/MCL (ref 0–0.9)
EOSINOPHIL NFR BLD AUTO: 0.2 % (ref 0–6.5)
ERYTHROCYTE [DISTWIDTH] IN BLOOD BY AUTOMATED COUNT: 16.1 % (ref 11.5–17)
GLUCOSE SERPL-MCNC: 141 MG/DL (ref 82–115)
HCO3 UR-SCNC: 31.1 MMOL/L (ref 22–26)
HCT VFR BLD AUTO: 24.6 % (ref 42–52)
HGB BLD-MCNC: 7.9 GM/DL (ref 14–18)
IMM GRANULOCYTES # BLD AUTO: 0.15 10*3/UL (ref 0–0.02)
IMM GRANULOCYTES NFR BLD AUTO: 0.8 % (ref 0–0.43)
LYMPHOCYTES # BLD AUTO: 0.52 X10(3)/MCL (ref 0.6–4.6)
LYMPHOCYTES NFR BLD AUTO: 2.8 % (ref 16.2–38.3)
MAGNESIUM SERPL-MCNC: 2.14 MG/DL (ref 1.6–2.6)
MCH RBC QN AUTO: 30 PG (ref 27–31)
MCHC RBC AUTO-ENTMCNC: 32.1 GM/DL (ref 33–36)
MCV RBC AUTO: 93.5 FL (ref 80–94)
MONOCYTES # BLD AUTO: 0.59 X10(3)/MCL (ref 0.1–1.3)
MONOCYTES NFR BLD AUTO: 3.2 % (ref 4.7–11.3)
NEUTROPHILS # BLD AUTO: 17.38 X10(3)/MCL (ref 2.1–9.2)
NEUTROPHILS NFR BLD AUTO: 92.9 % (ref 49.1–73.4)
NRBC BLD AUTO-RTO: 0 % (ref 0–0.2)
PCO2 BLDA: 48 MMHG (ref 35–45)
PH SMN: 7.42 [PH] (ref 7.35–7.45)
PLATELET # BLD AUTO: 264 X10(3)/MCL (ref 130–400)
PMV BLD AUTO: 10.9 FL (ref 7.4–10.4)
PO2 BLDA: 149 MMHG (ref 50–100)
POC BE: 5.6 (ref -2–3)
POC SATURATED O2: 99 % (ref 96–97)
POTASSIUM SERPL-SCNC: 3.5 MMOL/L (ref 3.5–5.1)
RBC # BLD AUTO: 2.63 X10(6)/MCL (ref 4.7–6.1)
SODIUM SERPL-SCNC: 140 MMOL/L (ref 136–145)
WBC # SPEC AUTO: 18.7 X10(3)/MCL (ref 4.5–11.5)

## 2020-06-04 LAB
BUN SERPL-MCNC: 17.8 MG/DL (ref 8.4–25.7)
CALCIUM SERPL-MCNC: 7.9 MG/DL (ref 8.8–10)
CHLORIDE SERPL-SCNC: 108 MMOL/L (ref 98–107)
CO2 SERPL-SCNC: 28 MMOL/L (ref 23–31)
CREAT SERPL-MCNC: 0.46 MG/DL (ref 0.72–1.25)
CREAT/UREA NIT SERPL: 39
GLUCOSE SERPL-MCNC: 137 MG/DL (ref 82–115)
HCO3 UR-SCNC: 33.5 MMOL/L (ref 22–26)
PCO2 BLDA: 44 MMHG (ref 35–45)
PH SMN: 7.49 [PH] (ref 7.35–7.45)
PO2 BLDA: 91 MMHG (ref 50–100)
POC BE: 9.1 (ref -2–3)
POC SATURATED O2: 98 % (ref 96–97)
POTASSIUM SERPL-SCNC: 3.6 MMOL/L (ref 3.5–5.1)
SODIUM SERPL-SCNC: 141 MMOL/L (ref 136–145)
VANCOMYCIN TROUGH SERPL-MCNC: 13.8 UG/ML (ref 5–10)

## 2020-06-05 LAB
ALBUMIN SERPL-MCNC: 1.4 GM/DL (ref 3.4–4.8)
BUN SERPL-MCNC: 13.9 MG/DL (ref 8.4–25.7)
CALCIUM SERPL-MCNC: 8.3 MG/DL (ref 8.8–10)
CHLORIDE SERPL-SCNC: 108 MMOL/L (ref 98–107)
CO2 SERPL-SCNC: 28 MMOL/L (ref 23–31)
CREAT SERPL-MCNC: 0.45 MG/DL (ref 0.72–1.25)
GLUCOSE SERPL-MCNC: 135 MG/DL (ref 82–115)
MAGNESIUM SERPL-MCNC: 2.02 MG/DL (ref 1.6–2.6)
PHOSPHATE SERPL-MCNC: 1.7 MG/DL (ref 2.3–4.7)
POTASSIUM SERPL-SCNC: 3.9 MMOL/L (ref 3.5–5.1)
SODIUM SERPL-SCNC: 140 MMOL/L (ref 136–145)

## 2020-06-06 LAB
ABS NEUT (OLG): 5.38 X10(3)/MCL (ref 2.1–9.2)
ALBUMIN SERPL-MCNC: 1.3 GM/DL (ref 3.4–4.8)
BASOPHILS # BLD AUTO: 0.01 X10(3)/MCL (ref 0–0.2)
BASOPHILS NFR BLD AUTO: 0.2 % (ref 0–0.9)
BUN SERPL-MCNC: 13.9 MG/DL (ref 8.4–25.7)
CALCIUM SERPL-MCNC: 8.2 MG/DL (ref 8.8–10)
CHLORIDE SERPL-SCNC: 106 MMOL/L (ref 98–107)
CO2 SERPL-SCNC: 31 MMOL/L (ref 23–31)
CREAT SERPL-MCNC: 0.44 MG/DL (ref 0.72–1.25)
CROSSMATCH INTERPRETATION: NORMAL
EOSINOPHIL # BLD AUTO: 0.07 X10(3)/MCL (ref 0–0.9)
EOSINOPHIL NFR BLD AUTO: 1.1 % (ref 0–6.5)
ERYTHROCYTE [DISTWIDTH] IN BLOOD BY AUTOMATED COUNT: 15.9 % (ref 11.5–17)
GLUCOSE SERPL-MCNC: 113 MG/DL (ref 82–115)
GROUP & RH: NORMAL
HCT VFR BLD AUTO: 24.3 % (ref 42–52)
HGB BLD-MCNC: 7.4 GM/DL (ref 14–18)
IMM GRANULOCYTES # BLD AUTO: 0.02 10*3/UL (ref 0–0.02)
IMM GRANULOCYTES NFR BLD AUTO: 0.3 % (ref 0–0.43)
LYMPHOCYTES # BLD AUTO: 0.27 X10(3)/MCL (ref 0.6–4.6)
LYMPHOCYTES NFR BLD AUTO: 4.4 % (ref 16.2–38.3)
MAGNESIUM SERPL-MCNC: 1.97 MG/DL (ref 1.6–2.6)
MCH RBC QN AUTO: 28.9 PG (ref 27–31)
MCHC RBC AUTO-ENTMCNC: 30.5 GM/DL (ref 33–36)
MCV RBC AUTO: 94.9 FL (ref 80–94)
MONOCYTES # BLD AUTO: 0.35 X10(3)/MCL (ref 0.1–1.3)
MONOCYTES NFR BLD AUTO: 5.7 % (ref 4.7–11.3)
NEUTROPHILS # BLD AUTO: 5.38 X10(3)/MCL (ref 2.1–9.2)
NEUTROPHILS NFR BLD AUTO: 88.3 % (ref 49.1–73.4)
NRBC BLD AUTO-RTO: 0 % (ref 0–0.2)
PHOSPHATE SERPL-MCNC: 2 MG/DL (ref 2.3–4.7)
PLATELET # BLD AUTO: 237 X10(3)/MCL (ref 130–400)
PMV BLD AUTO: 11.2 FL (ref 7.4–10.4)
POTASSIUM SERPL-SCNC: 3.7 MMOL/L (ref 3.5–5.1)
PRODUCT READY: NORMAL
RBC # BLD AUTO: 2.56 X10(6)/MCL (ref 4.7–6.1)
SODIUM SERPL-SCNC: 140 MMOL/L (ref 136–145)
TRANSFUSION ORDER: NORMAL
VANCOMYCIN TROUGH SERPL-MCNC: 11.4 UG/ML (ref 5–10)
WBC # SPEC AUTO: 6.1 X10(3)/MCL (ref 4.5–11.5)

## 2020-06-07 LAB
ABS NEUT (OLG): 4.37 X10(3)/MCL (ref 2.1–9.2)
ERYTHROCYTE [DISTWIDTH] IN BLOOD BY AUTOMATED COUNT: 18.2 % (ref 11.5–17)
HCT VFR BLD AUTO: 29.7 % (ref 42–52)
HGB BLD-MCNC: 9.6 GM/DL (ref 14–18)
MCH RBC QN AUTO: 28.7 PG (ref 27–31)
MCHC RBC AUTO-ENTMCNC: 32.3 GM/DL (ref 33–36)
MCV RBC AUTO: 88.9 FL (ref 80–94)
NRBC BLD AUTO-RTO: 0 % (ref 0–0.2)
PLATELET # BLD AUTO: 193 X10(3)/MCL (ref 130–400)
PMV BLD AUTO: 10.8 FL (ref 7.4–10.4)
RBC # BLD AUTO: 3.34 X10(6)/MCL (ref 4.7–6.1)
WBC # SPEC AUTO: 5 X10(3)/MCL (ref 4.5–11.5)

## 2020-06-08 LAB
ABS NEUT (OLG): 3.05 X10(3)/MCL (ref 2.1–9.2)
BUN SERPL-MCNC: 15.2 MG/DL (ref 8.4–25.7)
CALCIUM SERPL-MCNC: 8 MG/DL (ref 8.8–10)
CHLORIDE SERPL-SCNC: 102 MMOL/L (ref 98–107)
CO2 SERPL-SCNC: 35 MMOL/L (ref 23–31)
CREAT SERPL-MCNC: 0.45 MG/DL (ref 0.72–1.25)
CREAT/UREA NIT SERPL: 34
ERYTHROCYTE [DISTWIDTH] IN BLOOD BY AUTOMATED COUNT: 17.8 % (ref 11.5–17)
GLUCOSE SERPL-MCNC: 134 MG/DL (ref 82–115)
HCT VFR BLD AUTO: 29.4 % (ref 42–52)
HGB BLD-MCNC: 9.3 GM/DL (ref 14–18)
MCH RBC QN AUTO: 28.2 PG (ref 27–31)
MCHC RBC AUTO-ENTMCNC: 31.6 GM/DL (ref 33–36)
MCV RBC AUTO: 89.1 FL (ref 80–94)
NRBC BLD AUTO-RTO: 0 % (ref 0–0.2)
PLATELET # BLD AUTO: 172 X10(3)/MCL (ref 130–400)
PMV BLD AUTO: 10.6 FL (ref 7.4–10.4)
POTASSIUM SERPL-SCNC: 3.3 MMOL/L (ref 3.5–5.1)
RBC # BLD AUTO: 3.3 X10(6)/MCL (ref 4.7–6.1)
SODIUM SERPL-SCNC: 142 MMOL/L (ref 136–145)
VANCOMYCIN TROUGH SERPL-MCNC: 13.2 UG/ML (ref 5–10)
WBC # SPEC AUTO: 3.6 X10(3)/MCL (ref 4.5–11.5)

## 2020-06-10 LAB
ABS NEUT (OLG): 3.92 X10(3)/MCL (ref 2.1–9.2)
ALBUMIN SERPL-MCNC: 1.5 GM/DL (ref 3.4–4.8)
BASOPHILS # BLD AUTO: 0.01 X10(3)/MCL (ref 0–0.2)
BASOPHILS NFR BLD AUTO: 0.2 % (ref 0–0.9)
BUN SERPL-MCNC: 16.4 MG/DL (ref 8.4–25.7)
CALCIUM SERPL-MCNC: 8.6 MG/DL (ref 8.8–10)
CHLORIDE SERPL-SCNC: 100 MMOL/L (ref 98–107)
CO2 SERPL-SCNC: 34 MMOL/L (ref 23–31)
CREAT SERPL-MCNC: 0.51 MG/DL (ref 0.72–1.25)
EOSINOPHIL # BLD AUTO: 0.01 X10(3)/MCL (ref 0–0.9)
EOSINOPHIL NFR BLD AUTO: 0.2 % (ref 0–6.5)
ERYTHROCYTE [DISTWIDTH] IN BLOOD BY AUTOMATED COUNT: 17.1 % (ref 11.5–17)
GLUCOSE SERPL-MCNC: 138 MG/DL (ref 82–115)
HCT VFR BLD AUTO: 32.2 % (ref 42–52)
HGB BLD-MCNC: 10.3 GM/DL (ref 14–18)
IMM GRANULOCYTES # BLD AUTO: 0.02 10*3/UL (ref 0–0.02)
IMM GRANULOCYTES NFR BLD AUTO: 0.4 % (ref 0–0.43)
LYMPHOCYTES # BLD AUTO: 0.29 X10(3)/MCL (ref 0.6–4.6)
LYMPHOCYTES NFR BLD AUTO: 6.3 % (ref 16.2–38.3)
MAGNESIUM SERPL-MCNC: 1.77 MG/DL (ref 1.6–2.6)
MCH RBC QN AUTO: 28.7 PG (ref 27–31)
MCHC RBC AUTO-ENTMCNC: 32 GM/DL (ref 33–36)
MCV RBC AUTO: 89.7 FL (ref 80–94)
MONOCYTES # BLD AUTO: 0.38 X10(3)/MCL (ref 0.1–1.3)
MONOCYTES NFR BLD AUTO: 8.2 % (ref 4.7–11.3)
NEUTROPHILS # BLD AUTO: 3.92 X10(3)/MCL (ref 2.1–9.2)
NEUTROPHILS NFR BLD AUTO: 84.7 % (ref 49.1–73.4)
NRBC BLD AUTO-RTO: 0 % (ref 0–0.2)
PHOSPHATE SERPL-MCNC: 2 MG/DL (ref 2.3–4.7)
PLATELET # BLD AUTO: 165 X10(3)/MCL (ref 130–400)
PMV BLD AUTO: 10.4 FL (ref 7.4–10.4)
POTASSIUM SERPL-SCNC: 4 MMOL/L (ref 3.5–5.1)
RBC # BLD AUTO: 3.59 X10(6)/MCL (ref 4.7–6.1)
SODIUM SERPL-SCNC: 137 MMOL/L (ref 136–145)
VANCOMYCIN TROUGH SERPL-MCNC: 24.6 UG/ML (ref 5–10)
WBC # SPEC AUTO: 4.6 X10(3)/MCL (ref 4.5–11.5)

## 2020-06-11 LAB
BUN SERPL-MCNC: 16 MG/DL (ref 8.4–25.7)
CALCIUM SERPL-MCNC: 8.6 MG/DL (ref 8.8–10)
CHLORIDE SERPL-SCNC: 100 MMOL/L (ref 98–107)
CO2 SERPL-SCNC: 33 MMOL/L (ref 23–31)
CREAT SERPL-MCNC: 0.46 MG/DL (ref 0.72–1.25)
CREAT/UREA NIT SERPL: 35
GLUCOSE SERPL-MCNC: 142 MG/DL (ref 82–115)
POTASSIUM SERPL-SCNC: 3.9 MMOL/L (ref 3.5–5.1)
SODIUM SERPL-SCNC: 138 MMOL/L (ref 136–145)
VANCOMYCIN TROUGH SERPL-MCNC: 8 UG/ML (ref 5–10)

## 2020-06-12 LAB
ABS NEUT (OLG): 7.83 X10(3)/MCL (ref 2.1–9.2)
BASOPHILS # BLD AUTO: 0.02 X10(3)/MCL (ref 0–0.2)
BASOPHILS NFR BLD AUTO: 0.2 % (ref 0–0.9)
BUN SERPL-MCNC: 15.8 MG/DL (ref 8.4–25.7)
CALCIUM SERPL-MCNC: 7.8 MG/DL (ref 8.8–10)
CHLORIDE SERPL-SCNC: 100 MMOL/L (ref 98–107)
CO2 SERPL-SCNC: 32 MMOL/L (ref 23–31)
CREAT SERPL-MCNC: 0.41 MG/DL (ref 0.72–1.25)
CREAT/UREA NIT SERPL: 39
EOSINOPHIL # BLD AUTO: 0.05 X10(3)/MCL (ref 0–0.9)
EOSINOPHIL NFR BLD AUTO: 0.6 % (ref 0–6.5)
ERYTHROCYTE [DISTWIDTH] IN BLOOD BY AUTOMATED COUNT: 16.5 % (ref 11.5–17)
GLUCOSE SERPL-MCNC: 108 MG/DL (ref 82–115)
HCT VFR BLD AUTO: 32.1 % (ref 42–52)
HGB BLD-MCNC: 9.9 GM/DL (ref 14–18)
IMM GRANULOCYTES # BLD AUTO: 0.04 10*3/UL (ref 0–0.02)
IMM GRANULOCYTES NFR BLD AUTO: 0.4 % (ref 0–0.43)
LYMPHOCYTES # BLD AUTO: 0.56 X10(3)/MCL (ref 0.6–4.6)
LYMPHOCYTES NFR BLD AUTO: 6.2 % (ref 16.2–38.3)
MCH RBC QN AUTO: 27.7 PG (ref 27–31)
MCHC RBC AUTO-ENTMCNC: 30.8 GM/DL (ref 33–36)
MCV RBC AUTO: 89.7 FL (ref 80–94)
MONOCYTES # BLD AUTO: 0.48 X10(3)/MCL (ref 0.1–1.3)
MONOCYTES NFR BLD AUTO: 5.3 % (ref 4.7–11.3)
NEUTROPHILS # BLD AUTO: 7.83 X10(3)/MCL (ref 2.1–9.2)
NEUTROPHILS NFR BLD AUTO: 87.3 % (ref 49.1–73.4)
NRBC BLD AUTO-RTO: 0 % (ref 0–0.2)
PLATELET # BLD AUTO: 162 X10(3)/MCL (ref 130–400)
PMV BLD AUTO: 11.2 FL (ref 7.4–10.4)
POTASSIUM SERPL-SCNC: 3.9 MMOL/L (ref 3.5–5.1)
RBC # BLD AUTO: 3.58 X10(6)/MCL (ref 4.7–6.1)
SODIUM SERPL-SCNC: 137 MMOL/L (ref 136–145)
WBC # SPEC AUTO: 9 X10(3)/MCL (ref 4.5–11.5)

## 2020-06-13 LAB
ABS NEUT (OLG): 7.06 X10(3)/MCL (ref 2.1–9.2)
BASOPHILS # BLD AUTO: 0.01 X10(3)/MCL (ref 0–0.2)
BASOPHILS NFR BLD AUTO: 0.1 % (ref 0–0.9)
BUN SERPL-MCNC: 13 MG/DL (ref 8.4–25.7)
CALCIUM SERPL-MCNC: 7.9 MG/DL (ref 8.8–10)
CHLORIDE SERPL-SCNC: 103 MMOL/L (ref 98–107)
CO2 SERPL-SCNC: 31 MMOL/L (ref 23–31)
CREAT SERPL-MCNC: 0.45 MG/DL (ref 0.72–1.25)
CREAT/UREA NIT SERPL: 29
EOSINOPHIL # BLD AUTO: 0.04 X10(3)/MCL (ref 0–0.9)
EOSINOPHIL NFR BLD AUTO: 0.5 % (ref 0–6.5)
ERYTHROCYTE [DISTWIDTH] IN BLOOD BY AUTOMATED COUNT: 16.2 % (ref 11.5–17)
GLUCOSE SERPL-MCNC: 132 MG/DL (ref 82–115)
HCT VFR BLD AUTO: 28 % (ref 42–52)
HGB BLD-MCNC: 8.7 GM/DL (ref 14–18)
IMM GRANULOCYTES # BLD AUTO: 0.04 10*3/UL (ref 0–0.02)
IMM GRANULOCYTES NFR BLD AUTO: 0.5 % (ref 0–0.43)
LYMPHOCYTES # BLD AUTO: 0.47 X10(3)/MCL (ref 0.6–4.6)
LYMPHOCYTES NFR BLD AUTO: 5.8 % (ref 16.2–38.3)
MCH RBC QN AUTO: 27.8 PG (ref 27–31)
MCHC RBC AUTO-ENTMCNC: 31.1 GM/DL (ref 33–36)
MCV RBC AUTO: 89.5 FL (ref 80–94)
MONOCYTES # BLD AUTO: 0.44 X10(3)/MCL (ref 0.1–1.3)
MONOCYTES NFR BLD AUTO: 5.5 % (ref 4.7–11.3)
NEUTROPHILS # BLD AUTO: 7.06 X10(3)/MCL (ref 2.1–9.2)
NEUTROPHILS NFR BLD AUTO: 87.6 % (ref 49.1–73.4)
NRBC BLD AUTO-RTO: 0 % (ref 0–0.2)
PLATELET # BLD AUTO: 195 X10(3)/MCL (ref 130–400)
PMV BLD AUTO: 10.8 FL (ref 7.4–10.4)
POTASSIUM SERPL-SCNC: 3.5 MMOL/L (ref 3.5–5.1)
RBC # BLD AUTO: 3.13 X10(6)/MCL (ref 4.7–6.1)
SODIUM SERPL-SCNC: 138 MMOL/L (ref 136–145)
VANCOMYCIN TROUGH SERPL-MCNC: 7.7 UG/ML (ref 5–10)
WBC # SPEC AUTO: 8.1 X10(3)/MCL (ref 4.5–11.5)

## 2020-06-15 LAB
ABS NEUT (OLG): 5.43 X10(3)/MCL (ref 2.1–9.2)
BASOPHILS # BLD AUTO: 0.01 X10(3)/MCL (ref 0–0.2)
BASOPHILS NFR BLD AUTO: 0.2 % (ref 0–0.9)
BUN SERPL-MCNC: 18.4 MG/DL (ref 8.4–25.7)
CALCIUM SERPL-MCNC: 8.9 MG/DL (ref 8.8–10)
CHLORIDE SERPL-SCNC: 103 MMOL/L (ref 98–107)
CO2 SERPL-SCNC: 33 MMOL/L (ref 23–31)
CREAT SERPL-MCNC: 0.49 MG/DL (ref 0.72–1.25)
CREAT/UREA NIT SERPL: 38
EOSINOPHIL # BLD AUTO: 0.16 X10(3)/MCL (ref 0–0.9)
EOSINOPHIL NFR BLD AUTO: 2.4 % (ref 0–6.5)
ERYTHROCYTE [DISTWIDTH] IN BLOOD BY AUTOMATED COUNT: 16.4 % (ref 11.5–17)
GLUCOSE SERPL-MCNC: 129 MG/DL (ref 82–115)
HCT VFR BLD AUTO: 29.9 % (ref 42–52)
HGB BLD-MCNC: 9.3 GM/DL (ref 14–18)
IMM GRANULOCYTES # BLD AUTO: 0.03 10*3/UL (ref 0–0.02)
IMM GRANULOCYTES NFR BLD AUTO: 0.5 % (ref 0–0.43)
LYMPHOCYTES # BLD AUTO: 0.46 X10(3)/MCL (ref 0.6–4.6)
LYMPHOCYTES NFR BLD AUTO: 7 % (ref 16.2–38.3)
MCH RBC QN AUTO: 27.8 PG (ref 27–31)
MCHC RBC AUTO-ENTMCNC: 31.1 GM/DL (ref 33–36)
MCV RBC AUTO: 89.5 FL (ref 80–94)
MONOCYTES # BLD AUTO: 0.48 X10(3)/MCL (ref 0.1–1.3)
MONOCYTES NFR BLD AUTO: 7.3 % (ref 4.7–11.3)
NEUTROPHILS # BLD AUTO: 5.43 X10(3)/MCL (ref 2.1–9.2)
NEUTROPHILS NFR BLD AUTO: 82.6 % (ref 49.1–73.4)
NRBC BLD AUTO-RTO: 0 % (ref 0–0.2)
PLATELET # BLD AUTO: 246 X10(3)/MCL (ref 130–400)
PMV BLD AUTO: 10.7 FL (ref 7.4–10.4)
POTASSIUM SERPL-SCNC: 3.9 MMOL/L (ref 3.5–5.1)
RBC # BLD AUTO: 3.34 X10(6)/MCL (ref 4.7–6.1)
SODIUM SERPL-SCNC: 138 MMOL/L (ref 136–145)
VANCOMYCIN TROUGH SERPL-MCNC: 19.7 UG/ML (ref 5–10)
WBC # SPEC AUTO: 6.6 X10(3)/MCL (ref 4.5–11.5)

## 2020-06-16 LAB — VANCOMYCIN TROUGH SERPL-MCNC: 24.7 UG/ML (ref 5–10)

## 2020-06-17 LAB — VANCOMYCIN SERPL-MCNC: 14.4 UG/ML

## 2020-06-18 LAB
ABS NEUT (OLG): 4.9 X10(3)/MCL (ref 2.1–9.2)
ALBUMIN SERPL-MCNC: 1.6 GM/DL (ref 3.4–4.8)
APPEARANCE, UA: CLEAR
BACTERIA SPEC CULT: ABNORMAL
BILIRUB UR QL STRIP: NEGATIVE
BUN SERPL-MCNC: 17.5 MG/DL (ref 8.4–25.7)
CALCIUM SERPL-MCNC: 8.7 MG/DL (ref 8.8–10)
CHLORIDE SERPL-SCNC: 102 MMOL/L (ref 98–107)
CO2 SERPL-SCNC: 30 MMOL/L (ref 23–31)
COLOR UR: YELLOW
CREAT SERPL-MCNC: 0.48 MG/DL (ref 0.72–1.25)
ERYTHROCYTE [DISTWIDTH] IN BLOOD BY AUTOMATED COUNT: 16.2 % (ref 11.5–17)
GLUCOSE (UA): NEGATIVE
GLUCOSE SERPL-MCNC: 118 MG/DL (ref 82–115)
HCT VFR BLD AUTO: 25.6 % (ref 42–52)
HGB BLD-MCNC: 8 GM/DL (ref 14–18)
HGB UR QL STRIP: ABNORMAL
KETONES UR QL STRIP: NEGATIVE
LEUKOCYTE ESTERASE UR QL STRIP: ABNORMAL
MAGNESIUM SERPL-MCNC: 1.92 MG/DL (ref 1.6–2.6)
MCH RBC QN AUTO: 27.7 PG (ref 27–31)
MCHC RBC AUTO-ENTMCNC: 31.3 GM/DL (ref 33–36)
MCV RBC AUTO: 88.6 FL (ref 80–94)
NITRITE UR QL STRIP: NEGATIVE
NRBC BLD AUTO-RTO: 0 % (ref 0–0.2)
PH UR STRIP: 7 [PH] (ref 5–9)
PHOSPHATE SERPL-MCNC: 2.4 MG/DL (ref 2.3–4.7)
PLATELET # BLD AUTO: 338 X10(3)/MCL (ref 130–400)
PMV BLD AUTO: 10.3 FL (ref 7.4–10.4)
POTASSIUM SERPL-SCNC: 3.8 MMOL/L (ref 3.5–5.1)
PROT UR QL STRIP: NEGATIVE
RBC # BLD AUTO: 2.89 X10(6)/MCL (ref 4.7–6.1)
RBC #/AREA URNS HPF: ABNORMAL /HPF
SODIUM SERPL-SCNC: 139 MMOL/L (ref 136–145)
SP GR UR STRIP: 1.02 (ref 1–1.03)
SQUAMOUS EPITHELIAL, UA: ABNORMAL
UROBILINOGEN UR STRIP-ACNC: 0.2
WBC # SPEC AUTO: 6.1 X10(3)/MCL (ref 4.5–11.5)
WBC #/AREA URNS HPF: ABNORMAL /HPF

## 2020-06-19 LAB
ABS NEUT (OLG): 4.55 X10(3)/MCL (ref 2.1–9.2)
ALBUMIN SERPL-MCNC: 1.9 GM/DL (ref 3.4–5)
ALBUMIN/GLOB SERPL: 0.4 RATIO (ref 1.1–2)
ALP SERPL-CCNC: 76 UNIT/L (ref 46–116)
ALT SERPL-CCNC: 12 UNIT/L (ref 12–78)
AST SERPL-CCNC: 21 UNIT/L (ref 15–37)
BASOPHILS # BLD AUTO: 0 X10(3)/MCL (ref 0–0.2)
BASOPHILS NFR BLD AUTO: 0 %
BILIRUB SERPL-MCNC: 0.5 MG/DL (ref 0.2–1)
BILIRUBIN DIRECT+TOT PNL SERPL-MCNC: 0.11 MG/DL (ref 0–0.2)
BILIRUBIN DIRECT+TOT PNL SERPL-MCNC: 0.39 MG/DL (ref 0–0.8)
BUN SERPL-MCNC: 18 MG/DL (ref 7–18)
CALCIUM SERPL-MCNC: 9 MG/DL (ref 8.5–10.1)
CHLORIDE SERPL-SCNC: 103 MMOL/L (ref 98–107)
CO2 SERPL-SCNC: 30.7 MMOL/L (ref 21–32)
CREAT SERPL-MCNC: 0.34 MG/DL (ref 0.6–1.3)
EOSINOPHIL # BLD AUTO: 0.1 X10(3)/MCL (ref 0–0.9)
EOSINOPHIL NFR BLD AUTO: 2 %
ERYTHROCYTE [DISTWIDTH] IN BLOOD BY AUTOMATED COUNT: 16.5 % (ref 11.5–17)
GLOBULIN SER-MCNC: 4.5 GM/DL (ref 2.4–3.5)
GLUCOSE SERPL-MCNC: 131 MG/DL (ref 74–106)
HCT VFR BLD AUTO: 34.3 % (ref 42–52)
HGB BLD-MCNC: 10.6 GM/DL (ref 14–18)
LYMPHOCYTES # BLD AUTO: 0.6 X10(3)/MCL (ref 0.6–4.6)
LYMPHOCYTES NFR BLD AUTO: 11 %
MCH RBC QN AUTO: 27.9 PG (ref 27–31)
MCHC RBC AUTO-ENTMCNC: 30.9 GM/DL (ref 33–36)
MCV RBC AUTO: 90.3 FL (ref 80–94)
MONOCYTES # BLD AUTO: 0.2 X10(3)/MCL (ref 0.1–1.3)
MONOCYTES NFR BLD AUTO: 4 %
NEUTROPHILS # BLD AUTO: 4.55 X10(3)/MCL (ref 1.4–7.9)
NEUTROPHILS NFR BLD AUTO: 82 %
PLATELET # BLD AUTO: 338 X10(3)/MCL (ref 130–400)
PMV BLD AUTO: 11 FL (ref 9.4–12.4)
POTASSIUM SERPL-SCNC: 4.9 MMOL/L (ref 3.5–5.1)
PROT SERPL-MCNC: 6.4 GM/DL (ref 6.4–8.2)
RBC # BLD AUTO: 3.8 X10(6)/MCL (ref 4.7–6.1)
SODIUM SERPL-SCNC: 137 MMOL/L (ref 136–145)
VANCOMYCIN TROUGH SERPL-MCNC: 12.1 MCG/ML (ref 5–15)
WBC # SPEC AUTO: 5.6 X10(3)/MCL (ref 4.5–11.5)

## 2020-06-20 LAB
FINAL CULTURE: NORMAL
VANCOMYCIN TROUGH SERPL-MCNC: 15.3 MCG/ML (ref 5–15)

## 2020-06-22 ENCOUNTER — HISTORICAL (OUTPATIENT)
Dept: ADMINISTRATIVE | Facility: HOSPITAL | Age: 85
End: 2020-06-22

## 2020-06-22 LAB — VANCOMYCIN TROUGH SERPL-MCNC: 19.3 MCG/ML (ref 5–15)

## 2020-06-24 LAB — GRAM STN SPEC: NORMAL

## 2020-06-25 LAB
ABS NEUT (OLG): 5.56 X10(3)/MCL (ref 2.1–9.2)
ALBUMIN SERPL-MCNC: 2.2 GM/DL (ref 3.4–5)
ALBUMIN/GLOB SERPL: 0.4 RATIO (ref 1.1–2)
ALP SERPL-CCNC: 98 UNIT/L (ref 46–116)
ALT SERPL-CCNC: 7 UNIT/L (ref 12–78)
AST SERPL-CCNC: 12 UNIT/L (ref 15–37)
BASOPHILS # BLD AUTO: 0 X10(3)/MCL (ref 0–0.2)
BASOPHILS NFR BLD AUTO: 0 %
BILIRUB SERPL-MCNC: 0.4 MG/DL (ref 0.2–1)
BILIRUBIN DIRECT+TOT PNL SERPL-MCNC: 0.1 MG/DL (ref 0–0.2)
BILIRUBIN DIRECT+TOT PNL SERPL-MCNC: 0.3 MG/DL (ref 0–0.8)
BUN SERPL-MCNC: 20.1 MG/DL (ref 7–18)
CALCIUM SERPL-MCNC: 9.8 MG/DL (ref 8.5–10.1)
CHLORIDE SERPL-SCNC: 100 MMOL/L (ref 98–107)
CO2 SERPL-SCNC: 33.9 MMOL/L (ref 21–32)
CREAT SERPL-MCNC: 0.46 MG/DL (ref 0.6–1.3)
CRP SERPL HS-MCNC: 63.06 MG/L (ref 0–3)
EOSINOPHIL # BLD AUTO: 0.2 X10(3)/MCL (ref 0–0.9)
EOSINOPHIL NFR BLD AUTO: 2 %
ERYTHROCYTE [DISTWIDTH] IN BLOOD BY AUTOMATED COUNT: 16.8 % (ref 11.5–17)
ERYTHROCYTE [SEDIMENTATION RATE] IN BLOOD: 76 MM/HR (ref 0–15)
FINAL CULTURE: NORMAL
GLOBULIN SER-MCNC: 5.4 GM/DL (ref 2.4–3.5)
GLUCOSE SERPL-MCNC: 150 MG/DL (ref 74–106)
HCT VFR BLD AUTO: 33.6 % (ref 42–52)
HGB BLD-MCNC: 10.5 GM/DL (ref 14–18)
IMM GRANULOCYTES # BLD AUTO: 0.02 % (ref 0–0.02)
IMM GRANULOCYTES NFR BLD AUTO: 0.3 % (ref 0–0.43)
LYMPHOCYTES # BLD AUTO: 0.8 X10(3)/MCL (ref 0.6–4.6)
LYMPHOCYTES NFR BLD AUTO: 12 %
MAGNESIUM SERPL-MCNC: 1.7 MG/DL (ref 1.8–2.4)
MCH RBC QN AUTO: 27.9 PG (ref 27–31)
MCHC RBC AUTO-ENTMCNC: 31.3 GM/DL (ref 33–36)
MCV RBC AUTO: 89.4 FL (ref 80–94)
MONOCYTES # BLD AUTO: 0.4 X10(3)/MCL (ref 0.1–1.3)
MONOCYTES NFR BLD AUTO: 5 %
NEUTROPHILS # BLD AUTO: 5.56 X10(3)/MCL (ref 1.4–7.9)
NEUTROPHILS NFR BLD AUTO: 80 %
PLATELET # BLD AUTO: 355 X10(3)/MCL (ref 130–400)
PMV BLD AUTO: 10.3 FL (ref 9.4–12.4)
POTASSIUM SERPL-SCNC: 3.7 MMOL/L (ref 3.5–5.1)
PROT SERPL-MCNC: 7.6 GM/DL (ref 6.4–8.2)
RBC # BLD AUTO: 3.76 X10(6)/MCL (ref 4.7–6.1)
SODIUM SERPL-SCNC: 137 MMOL/L (ref 136–145)
WBC # SPEC AUTO: 7 X10(3)/MCL (ref 4.5–11.5)

## 2020-06-30 LAB
ABS NEUT (OLG): 7.72 X10(3)/MCL (ref 2.1–9.2)
ALBUMIN SERPL-MCNC: 2.3 GM/DL (ref 3.4–5)
ALBUMIN/GLOB SERPL: 0.5 RATIO (ref 1.1–2)
ALP SERPL-CCNC: 103 UNIT/L (ref 46–116)
ALT SERPL-CCNC: 7 UNIT/L (ref 12–78)
AST SERPL-CCNC: 13 UNIT/L (ref 15–37)
BASOPHILS # BLD AUTO: 0 X10(3)/MCL (ref 0–0.2)
BASOPHILS NFR BLD AUTO: 0 %
BILIRUB SERPL-MCNC: 0.3 MG/DL (ref 0.2–1)
BILIRUBIN DIRECT+TOT PNL SERPL-MCNC: 0.14 MG/DL (ref 0–0.2)
BILIRUBIN DIRECT+TOT PNL SERPL-MCNC: 0.16 MG/DL (ref 0–0.8)
BUN SERPL-MCNC: 20.4 MG/DL (ref 7–18)
CALCIUM SERPL-MCNC: 10 MG/DL (ref 8.5–10.1)
CHLORIDE SERPL-SCNC: 102 MMOL/L (ref 98–107)
CO2 SERPL-SCNC: 32.8 MMOL/L (ref 21–32)
CREAT SERPL-MCNC: 0.38 MG/DL (ref 0.6–1.3)
CRP SERPL HS-MCNC: 43.09 MG/L (ref 0–3)
EOSINOPHIL # BLD AUTO: 0.1 X10(3)/MCL (ref 0–0.9)
EOSINOPHIL NFR BLD AUTO: 1 %
ERYTHROCYTE [DISTWIDTH] IN BLOOD BY AUTOMATED COUNT: 16.9 % (ref 11.5–17)
ERYTHROCYTE [SEDIMENTATION RATE] IN BLOOD: 60 MM/HR (ref 0–15)
GLOBULIN SER-MCNC: 5.1 GM/DL (ref 2.4–3.5)
GLUCOSE SERPL-MCNC: 131 MG/DL (ref 74–106)
GRAM STN SPEC: NORMAL
HCT VFR BLD AUTO: 34.5 % (ref 42–52)
HGB BLD-MCNC: 10.8 GM/DL (ref 14–18)
IMM GRANULOCYTES # BLD AUTO: 0.04 % (ref 0–0.02)
IMM GRANULOCYTES NFR BLD AUTO: 0.4 % (ref 0–0.43)
LYMPHOCYTES # BLD AUTO: 0.7 X10(3)/MCL (ref 0.6–4.6)
LYMPHOCYTES NFR BLD AUTO: 8 %
MAGNESIUM SERPL-MCNC: 2.1 MG/DL (ref 1.8–2.4)
MCH RBC QN AUTO: 27.8 PG (ref 27–31)
MCHC RBC AUTO-ENTMCNC: 31.3 GM/DL (ref 33–36)
MCV RBC AUTO: 88.9 FL (ref 80–94)
MONOCYTES # BLD AUTO: 0.5 X10(3)/MCL (ref 0.1–1.3)
MONOCYTES NFR BLD AUTO: 6 %
NEUTROPHILS # BLD AUTO: 7.72 X10(3)/MCL (ref 1.4–7.9)
NEUTROPHILS NFR BLD AUTO: 84 %
PLATELET # BLD AUTO: 289 X10(3)/MCL (ref 130–400)
PMV BLD AUTO: 9.8 FL (ref 9.4–12.4)
POTASSIUM SERPL-SCNC: 3.9 MMOL/L (ref 3.5–5.1)
PROT SERPL-MCNC: 7.4 GM/DL (ref 6.4–8.2)
RBC # BLD AUTO: 3.88 X10(6)/MCL (ref 4.7–6.1)
SODIUM SERPL-SCNC: 138 MMOL/L (ref 136–145)
WBC # SPEC AUTO: 9.2 X10(3)/MCL (ref 4.5–11.5)

## 2020-07-01 LAB — FINAL CULTURE: NORMAL

## 2020-07-02 LAB
ABS NEUT (OLG): 8.76 X10(3)/MCL (ref 2.1–9.2)
ALBUMIN SERPL-MCNC: 2.2 GM/DL (ref 3.4–5)
ALBUMIN/GLOB SERPL: 0.4 RATIO (ref 1.1–2)
ALP SERPL-CCNC: 84 UNIT/L (ref 46–116)
ALT SERPL-CCNC: 7 UNIT/L (ref 12–78)
AST SERPL-CCNC: 19 UNIT/L (ref 15–37)
BASOPHILS # BLD AUTO: 0 X10(3)/MCL (ref 0–0.2)
BASOPHILS NFR BLD AUTO: 0 %
BILIRUB SERPL-MCNC: 0.5 MG/DL (ref 0.2–1)
BILIRUBIN DIRECT+TOT PNL SERPL-MCNC: 0.2 MG/DL (ref 0–0.2)
BILIRUBIN DIRECT+TOT PNL SERPL-MCNC: 0.3 MG/DL (ref 0–0.8)
BUN SERPL-MCNC: 19.1 MG/DL (ref 7–18)
CALCIUM SERPL-MCNC: 10 MG/DL (ref 8.5–10.1)
CHLORIDE SERPL-SCNC: 100 MMOL/L (ref 98–107)
CO2 SERPL-SCNC: 29.7 MMOL/L (ref 21–32)
CREAT SERPL-MCNC: 0.61 MG/DL (ref 0.6–1.3)
CRP SERPL HS-MCNC: 93.61 MG/L (ref 0–3)
EOSINOPHIL # BLD AUTO: 0 X10(3)/MCL (ref 0–0.9)
EOSINOPHIL NFR BLD AUTO: 0 %
ERYTHROCYTE [DISTWIDTH] IN BLOOD BY AUTOMATED COUNT: 16.9 % (ref 11.5–17)
ERYTHROCYTE [SEDIMENTATION RATE] IN BLOOD: 67 MM/HR (ref 0–15)
GLOBULIN SER-MCNC: 5.2 GM/DL (ref 2.4–3.5)
GLUCOSE SERPL-MCNC: 139 MG/DL (ref 74–106)
HCT VFR BLD AUTO: 34.9 % (ref 42–52)
HGB BLD-MCNC: 11 GM/DL (ref 14–18)
IMM GRANULOCYTES # BLD AUTO: 0.03 % (ref 0–0.02)
IMM GRANULOCYTES NFR BLD AUTO: 0.3 % (ref 0–0.43)
LYMPHOCYTES # BLD AUTO: 0.8 X10(3)/MCL (ref 0.6–4.6)
LYMPHOCYTES NFR BLD AUTO: 8 %
MAGNESIUM SERPL-MCNC: 2 MG/DL (ref 1.8–2.4)
MCH RBC QN AUTO: 27.7 PG (ref 27–31)
MCHC RBC AUTO-ENTMCNC: 31.5 GM/DL (ref 33–36)
MCV RBC AUTO: 87.9 FL (ref 80–94)
MONOCYTES # BLD AUTO: 0.4 X10(3)/MCL (ref 0.1–1.3)
MONOCYTES NFR BLD AUTO: 4 %
NEUTROPHILS # BLD AUTO: 8.76 X10(3)/MCL (ref 1.4–7.9)
NEUTROPHILS NFR BLD AUTO: 87 %
PLATELET # BLD AUTO: 299 X10(3)/MCL (ref 130–400)
PMV BLD AUTO: 10.6 FL (ref 9.4–12.4)
POTASSIUM SERPL-SCNC: 4.1 MMOL/L (ref 3.5–5.1)
PROT SERPL-MCNC: 7.4 GM/DL (ref 6.4–8.2)
RBC # BLD AUTO: 3.97 X10(6)/MCL (ref 4.7–6.1)
SODIUM SERPL-SCNC: 136 MMOL/L (ref 136–145)
WBC # SPEC AUTO: 10.1 X10(3)/MCL (ref 4.5–11.5)

## 2020-07-03 LAB — GRAM STN SPEC: NORMAL

## 2020-07-05 LAB — FINAL CULTURE: NORMAL

## 2020-07-06 LAB — VANCOMYCIN TROUGH SERPL-MCNC: 6.5 MCG/ML (ref 5–15)

## 2020-07-07 LAB — VANCOMYCIN TROUGH SERPL-MCNC: 17.1 MCG/ML (ref 5–15)

## 2020-07-08 LAB
ABS NEUT (OLG): 5.68 X10(3)/MCL (ref 2.1–9.2)
ALBUMIN SERPL-MCNC: 2 GM/DL (ref 3.4–5)
ALBUMIN/GLOB SERPL: 0.4 RATIO (ref 1.1–2)
ALP SERPL-CCNC: 86 UNIT/L (ref 46–116)
ALT SERPL-CCNC: 8 UNIT/L (ref 12–78)
AST SERPL-CCNC: 18 UNIT/L (ref 15–37)
BASOPHILS # BLD AUTO: 0 X10(3)/MCL (ref 0–0.2)
BASOPHILS NFR BLD AUTO: 0 %
BILIRUB SERPL-MCNC: 0.3 MG/DL (ref 0.2–1)
BILIRUBIN DIRECT+TOT PNL SERPL-MCNC: 0.12 MG/DL (ref 0–0.2)
BILIRUBIN DIRECT+TOT PNL SERPL-MCNC: 0.18 MG/DL (ref 0–0.8)
BUN SERPL-MCNC: 16.9 MG/DL (ref 7–18)
CALCIUM SERPL-MCNC: 9.7 MG/DL (ref 8.5–10.1)
CHLORIDE SERPL-SCNC: 101 MMOL/L (ref 98–107)
CO2 SERPL-SCNC: 35.8 MMOL/L (ref 21–32)
CREAT SERPL-MCNC: 0.43 MG/DL (ref 0.6–1.3)
EOSINOPHIL # BLD AUTO: 0.1 X10(3)/MCL (ref 0–0.9)
EOSINOPHIL NFR BLD AUTO: 2 %
ERYTHROCYTE [DISTWIDTH] IN BLOOD BY AUTOMATED COUNT: 16.8 % (ref 11.5–17)
GLOBULIN SER-MCNC: 5.1 GM/DL (ref 2.4–3.5)
GLUCOSE SERPL-MCNC: 132 MG/DL (ref 74–106)
HCT VFR BLD AUTO: 33.9 % (ref 42–52)
HGB BLD-MCNC: 10.5 GM/DL (ref 14–18)
IMM GRANULOCYTES # BLD AUTO: 0.03 % (ref 0–0.02)
IMM GRANULOCYTES NFR BLD AUTO: 0.4 % (ref 0–0.43)
LYMPHOCYTES # BLD AUTO: 0.6 X10(3)/MCL (ref 0.6–4.6)
LYMPHOCYTES NFR BLD AUTO: 9 %
MAGNESIUM SERPL-MCNC: 1.9 MG/DL (ref 1.8–2.4)
MCH RBC QN AUTO: 27.3 PG (ref 27–31)
MCHC RBC AUTO-ENTMCNC: 31 GM/DL (ref 33–36)
MCV RBC AUTO: 88.3 FL (ref 80–94)
MONOCYTES # BLD AUTO: 0.5 X10(3)/MCL (ref 0.1–1.3)
MONOCYTES NFR BLD AUTO: 7 %
NEUTROPHILS # BLD AUTO: 5.68 X10(3)/MCL (ref 1.4–7.9)
NEUTROPHILS NFR BLD AUTO: 81 %
PHOSPHATE SERPL-MCNC: 2.6 MG/DL (ref 2.5–4.9)
PLATELET # BLD AUTO: 263 X10(3)/MCL (ref 130–400)
PMV BLD AUTO: 9.9 FL (ref 9.4–12.4)
POTASSIUM SERPL-SCNC: 4.1 MMOL/L (ref 3.5–5.1)
PROT SERPL-MCNC: 7.1 GM/DL (ref 6.4–8.2)
RBC # BLD AUTO: 3.84 X10(6)/MCL (ref 4.7–6.1)
SODIUM SERPL-SCNC: 140 MMOL/L (ref 136–145)
WBC # SPEC AUTO: 7 X10(3)/MCL (ref 4.5–11.5)

## 2020-07-09 LAB
FINAL CULTURE: NORMAL
FINAL CULTURE: NORMAL

## 2022-04-30 NOTE — DISCHARGE SUMMARY
Patient:   Bradly Richter            MRN: [id number]            FIN: [id number]               Age:   84 years     Sex:  Male     :  1935   Associated Diagnoses:   None   Author:   Karson Joshi MD      Hospital course:  Mr. Richter is an 84-year-old male whose past medical hx includes HTN, esophagitis, gastritis, chronic diarrhea, oropharyngeal dysphagia s/p PEG placement and prostate cancer. He initially presented to [facility] ED following a fall on 20. He apparently fell from standing and hit his head; also fell about a year ago and hit his head but did not seek medical treatment at that time. 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 He was subsequently transferred to [facility] for a higher level of care. He was admitted to ICU and evaluated by Neurosurgery. Initial EKG 20 at 1649 PM: Sinus bradycardia with a rate of 53, left anterior fascicular. He is evaluated by cardiology for cardiac clearance and deemed low risk for cardiac complications, and should f/u with his primary cardiologist due to abnormal EKG. On 20, he underwent right craniotomy with evacuation of SDH by Dr. Green. Repeat CT Head on 20 revealed interval R craniotomy and subdural drain. NS recommends repeat CT Scan on 20. Started on Tube feedings, and is tolerating. Cleared for downgrade to the floor on 5/10/20. Hospital medicine consulted to assume care. The patient went back to ICU on 20 bc he started to have some decrease of consciousness. A repeat CT scan showed increasing air, abnormal mass effect and shift. Neurosurgery reevaluated him because of increasing obtundation despite oxygenation and stripping of the drain. The decided to do a craniotomy and remove the bone to release the pressure. This was done on 20. Following the second surgery, he  gradually became more alert. He was unable to be weaned from the vent so a trach was placed on 5/18/20.     The patient was transferred to LTAC at [facility] for ongoing care on 5/20/20.  The patient was diagnosed with MRSE bacteremia on 5/22 & 5/26 and was placed on vancomycin.  Infectious disease was consulted and recommended 4 weeks of treatment until 6/25/20. The patient initially did well.  He was slowly able to tolerate longer trach collar trials and speak with the Passy Arias valve.  On 6/4/2020 the patient decompensated and had to be placed back on the ventilator.  He was diagnosed with PE on 6/5/2020.  He was started on full dose anticoagulation.  Due to his history of GI bleeding and blood transfusions GI was consulted to review the case. GI consider performing endoscopy on the patient.  However, after discussion with the patient's daughter Tiara she informed us she wanted limited intervention.  She did not want any procedures or surgeries performed including endoscopy or IVC filter placement.  The plan was to continue him on full dose anticoagulation and monitor his blood count.  Continue consider blood transfusions as needed and attempt to wean him off the ventilator.  Hypokalemia patient improved and we were successful in weaning him from the ventilator.  His blood count has fluctuated but he's not required transfusion since 6/7/20.  The patient is alert and nods his head to questions.  He isn't yet tolerating his speaking valve loculated prior in the hospitalization.  He remains extremely weak.  He will be discharged to [facility] on June 18 for ongoing therapy and to be closer to his family.  Discharge planning discussed in detail with the patient's daughter Tiara prior to discharge.  The patient's CODE STATUS remains DNR.    Discharge diagnoses:  Acute Pulmonary emboli b/l- on eliquis  MRSE Bacteremia  Acute anemia s/p transfusions  History of Duodenal Ulcer  Subdural  hematoma secondary to a fall   s/p craniotomy for subdural evacuation 05/08/2020, repeat craniotomy on 05/11/2020 with removal of skull cap.  Hypertension.  Acute Respiratory failure s/p trach placement  Hx of Oropharyngeal dysphagia prior peg placement  Remote history of prostate cancer.  Severe weakness and deconditioning.    Plan  cont aggressive therapy  monitory h/h and transfuse as needed  f/u with  as outpatient    Discharge time > 30 minutes

## 2022-04-30 NOTE — OP NOTE
Patient:   Bradly Whitney            MRN: [id number]            FIN: [id number]               Age:   81 years     Sex:  Male     :  1935   Associated Diagnoses:   None   Author:   Dov Fallon MD      Preoperative Diagnosis: Cataract Left eye    Postoperative Diagnosis: Cataract Left eye    Procedure: Phacoemulsification with intaocular lens implantations Left eye    Surgeon: Dov Fallon MD    Assistant: La Nena Temple, [facility]    Anestheisa: Topical    Complications: None    The patient was brought into the operating suite, where the patient was correctly identified as was the operative eye via timeout.  The patient was prepped and draped in a sterile ophthalmic fashion.  A lid speculum was placed in the operative eye and the microscope was brought into place.  A 1.0mm paracentesis was then made at (6) o'clock.  The anterior chamber was filled with Endocoat.  A (stemporal) clear corneal incision was made with a 2.4 mm keratome.  A 6 mm corneal marking ring was used to doni the cornea centered over the visual axis.  A 5.00 mm continuous curvilinear capsulorhexis was fashioned using a cystotome and microcapsular forceps.  Hydrodissection and hydrodelineation was performed with upreserved 1% Xylocaine.  The nucleus was then phacoemulsified with the Abbott phacoemulsification hand-piece with a total of (1) EFX.  The cortex was then removed with the Paolo I/A hand-piece. An KRYSTA lens model (ZCB00) with a power of (22.5) was placed in the capsular bag.  The Helon was then removed from the eye with the Paolo I/A hand piece. The anterior chamber was inflated and the wounds were hydrated with BSS.  The wounds were checked with Weck-Mildred sponges and found to be watertight.  The lid speculum was removed and topical antibiotics were placed on the operative eye.  The patient was brought to PACU in good condition.        Surgery Date 17 [facility]

## 2022-04-30 NOTE — H&P
Patient:   Bradly Richter            MRN: [id number]            FIN: [id number]               Age:   84 years     Sex:  Male     :  1935   Associated Diagnoses:   None   Author:   Karson Joshi MD      Basic Information   Source of history:  Medical record.    Referral source:  [facility].    History limitation:  Clinical condition.    PCP: None      Chief Complaint   Patient sent to LTAC for ongoing treatment and vent weaning.      History of Present Illness   Mr. Richter is an 84-year-old male whose past medical hx includes HTN, esophagitis, gastritis, chronic diarrhea, oropharyngeal dysphagia s/p PEG placement and prostate cancer. He initially presented to [facility] ED following a fall on 20. He apparently fell from standing and hit his head; also fell about a year ago and hit his head but did not seek medical treatment at that time. 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 He was subsequently transferred to [facility] for a higher level of care. He was admitted to ICU and evaluated by Neurosurgery. Initial EKG 20 at 1649 PM: Sinus bradycardia with a rate of 53, left anterior fascicular. He is evaluated by cardiology for cardiac clearance and deemed low risk for cardiac complications, and should f/u with his primary cardiologist within 1-2 due to abnormal EKG. On 20, he underwent right craniotomy with evacuation of SDH by Dr. Green. Repeat CT Head on 20 revealed interval R craniotomy and subdural drain. NS recommends repeat CT Scan on 20. Started on Tube feedings, and is tolerating. Cleared for downgrade to the floor on 5/10/20. Hospital medicine consulted to assume care. The patient went back to ICU on 20 bc he started to have some decrease of consciousness. A repeat CAT scan showed increasing air, abnormal mass effect and  shift. Neurosurgery reevaluated him because of increasing obtundation despite oxygenation and stripping of the drain. The decided to do a craniotomy and remove the bone, release the pressure. This was done on 5/11/20. Following the second surgery, he gradually became more alert. He was unable to be weaned from the vent so a trach was placed on 5/18/20. The patient was transferred to LTAC for ongoing care.   Subjective  Unable to obtain history from pt. His is on trach collar at the time of my evaluation. He nods his head occasionally to questions but didn't speak or try to communicate further. Case d/w RN and Resp therapist. No acute issues reported. The pt has lots of thin secretions.      Review of Systems   Except as documented, all other systems reviewed and negative.      Health Status   Allergies:    Allergic Reactions (Selected)  No Known Allergies,    Allergies (1) Active Reaction  No Known Allergies None Documented     Current medications:  (Selected)   Inpatient Medications  Ordered  APAP/butalbital/caffeine 325 mg-50 mg-40 mg oral tablet: 1 tab(s), form: Tab, Oral, q4hr PRN for headache, first dose 05/20/20 18:33:00 CDT  Protonix 40 mg Vial (IV Push): 40 mg, form: Injection, IV Push, q24hr, Infuse over: 0.5 hr, first dose 05/21/20 6:00:00 CDT  Sodium Chloride 0.9% Normal Saline Flush: 10 mL, form: Injection, IV Push, q12hr, first dose 05/20/20 19:00:00 CDT  Tylenol 325 mg oral tablet: 650 mg, form: Tab, Oral, q4hr PRN for pain, first dose 05/20/20 18:33:00 CDT  Vasotec 1.25 mg/mL intravenous solution: 1.25 mg, form: Soln, IV Push, q6hr PRN for hypertension, first dose 05/20/20 18:34:00 CDT  Zofran 2 mg/mL injectable solution: 4 mg, form: Injection, IV Push, q6hr PRN for nausea/vomiting, first dose 05/20/20 18:34:00 CDT  acetaminophen: 500 mg, form: Liquid, PEG Tube, q4hr PRN for fever, first dose 05/20/20 18:33:00 CDT  amlodipine 5 mg oral tablet: 10 mg, form: Tab, PEG Tube, Daily, first dose 05/21/20  9:00:00 CDT  bisacodyl 10 mg RECTAL suppository: 10 mg, form: Supp, AZ (rectal), Daily PRN for other (see comment), first dose 05/20/20 18:34:00 CDT  docusate 10 mg/mL oral liquid: 200 mg, form: Soln, PEG Tube, Once a day (at bedtime), first dose 05/20/20 21:00:00 CDT  labetalol 5 mg/mL intravenous solution: 10 mg, form: Soln, IV Push, q2hr PRN for hypertension, first dose 05/20/20 18:34:00 CDT  traMADol 50 mg oral tablet: 50 mg, form: Tab, Oral, q6hr PRN for pain, severe, first dose 05/20/20 18:34:00 CDT  Documented Medications  Documented  Fioricet oral capsule: Oral, q4hr, PRN PRN headache, 0 Refill(s)  Tylenol 325 mg oral tablet: 650 mg = 2 tab(s), Oral, q4hr, PRN PRN pain, 0 Refill(s)  Tylenol: 650 mg = 1 supp, AZ, q4hr, PRN PRN fever, 0 Refill(s)  Vasotec 1.25 mg/mL intravenous solution: 1.25 mg = 1 mL, IV Push, q6hr, PRN PRN hypertension, 0 Refill(s)  Zofran 2 mg/mL injectable solution: 4 mg = 2 mL, IV Push, q6hr, PRN PRN nausea/vomiting, 0 Refill(s)  acetaminophen: PEG Tube, q4hr, PRN PRN fever, 0 Refill(s)  amlodipine 5 mg oral tablet: 10 mg = 2 tab(s), PEG Tube, Daily, 0 Refill(s)  bisacodyl 10 mg RECTAL suppository: 10 mg = 1 supp, AZ (rectal), Daily, PRN PRN other (see comment), 0 Refill(s)  docusate 10 mg/mL oral liquid: 200 mg = 20 mL, PEG Tube, Once a day (at bedtime), 0 Refill(s)  labetalol 5 mg/mL intravenous solution: 10 mg = 2 mL, IV Push, q2hr, PRN PRN hypertension, 0 Refill(s)  morphine 1 mg/mL preservative-free injectable solution: IV, q4hr, PRN PRN breakthru pain, 0 Refill(s)  pantoprazole: 40 mg, IV Push, Daily, 0 Refill(s)  traMADol 50 mg oral tablet: 50 mg = 1 tab(s), Oral, q6hr, PRN PRN pain, severe, 0 Refill(s),    Medications (12) Active  Scheduled: (4)  amLODIPine 10 mg Tab UD (C)  10 mg 1 tab(s), PEG Tube, Daily  docusate 10 mg/mL Sol UD - 10mL  200 mg 20 mL, PEG Tube, Once a day (at bedtime)  pantoprazole  Inj  40 mg 1 EA, IV Push, q24hr  sodium chloride 0.9% Inj-10ml  10 mL, IV  Push, q12hr  Continuous: (0)  PRN: (8)  acetaminophen 160 mg/5 ml 4 oz. Liq.  500 mg 15.63 mL, PEG Tube, q4hr  acetaminophen 325 mg Tab  650 mg 2 tab(s), Oral, q4hr  APAP/butalb/caff(FIORICET) 093mn-52-01th Tab  1 tab(s), Oral, q4hr  bisacodyl 10 mg Sup  10 mg 1 supp, ND (rectal), Daily  enalapril 1.25 mg/mL Soln - 1 mL vial (LGSW)  1.25 mg 1 mL, IV Push, q6hr  labetalol 5 mg/mL 20mL vial  10 mg 2 mL, IV Push, q2hr  ondansetron 2 mg/mL inj - 2mL  4 mg 2 mL, IV Push, q6hr  traMADOL 50 mg Tab UD  50 mg 1 tab(s), Oral, q6hr     Problem list:    All Problems  Activity tolerance / SNOMED CT 5362631503 / Confirmed  Anorexia / SNOMED CT 028228458 / Confirmed  At risk of pressure sore / SNOMED CT 464024249 / Confirmed  Bruises easily / SNOMED CT 6352036808 / Confirmed  Cataract / SNOMED CT 482256300 / Confirmed  Diarrhea / SNOMED CT 080056994 / Confirmed  Heartburn / SNOMED CT 41151129 / Confirmed  Hernia / SNOMED CT 091328594 / Confirmed  Impaired mobility / SNOMED CT 865913327 / Confirmed  Malnutrition / SNOMED CT 418616328 / Confirmed  Malnutrition / SNOMED CT 612112811 / Confirmed  Prostate cancer / SNOMED CT [id number] / Confirmed  Resolved: can lie flat / SNOMED CT 637264716  Resolved: can walk 2 blocks briskly w/o CP/SOB / SNOMED CT 994371596  Resolved: Acid reflux / SNOMED CT 6115732054  Resolved: sinus allergies / SNOMED CT 1086750966  Resolved: Arthritis / SNOMED CT 6936130  Resolved: hearing loss / SNOMED CT 34142981  Resolved: h/o prostate ca 2000 / SNOMED CT 0722926873  Resolved: Hypertension / SNOMED CT 15718745  Resolved: Wears glasses / SNOMED CT 955878819  Canceled: saw card 1994, wk up neg,    Active Problems (12)  Activity tolerance   Anorexia   At risk of pressure sore   Bruises easily   Cataract   Diarrhea   Heartburn   Hernia   Impaired mobility   Malnutrition   Malnutrition   Prostate cancer         Histories   Past Medical History:    Active  Cataract (021030010)  Activity  tolerance (6116181398)  Hernia (843362939)  Bruises easily (4631798149)  Resolved  Wears glasses (629492094):  Resolved.  sinus allergies (6045317162):  Resolved.  Hypertension (32561122):  Resolved.  hearing loss (49990315):  Resolved.  can lie flat (568075662):  Resolved.  h/o prostate ca 2000 (4595291638):  Resolved.  Acid reflux (2108839194):  Resolved.  Arthritis (2270626):  Resolved.  can walk 2 blocks briskly w/o CP/SOB (362416773):  Resolved.   Family History: Dad -CVA, Mom -dementia   Procedure history:    Tracheostomy Percutaneous performed by MOHIT Lopez MD on 5/18/2020 at 84 Years.  Comments:  5/18/2020 10:21 JAIROT - Cathi Aguilar L  auto-populated from documented surgical case  Bronchoscopy, Therapeutic performed by Joe Limon MD on 5/18/2020 at 84 Years.  Comments:  5/18/2020 10:21 JAIROT - Cathi Aguilar L  auto-populated from documented surgical case  Craniotomy (None) performed by Lucia Green MD on 5/11/2020 at 84 Years.  Comments:  5/11/2020 22:29 CDT - Faisal Ritter RN  auto-populated from documented surgical case  Craniotomy Hematoma Evacuation (Right) performed by Lucia Green MD on 5/8/2020 at 84 Years.  Comments:  5/8/2020 15:55 CDT - Bethany Rogers RN  auto-populated from documented surgical case  PEG Tube Insertion Initial performed by Harvinder Villalta MD on 3/2/2020 at 84 Years.  Comments:  3/2/2020 11:50 CST - Dov Tanner RN  auto-populated from documented surgical case  Upper GI Endoscopy W/Submucosal Inj performed by Johnnie Velasco MD on 2/21/2020 at 84 Years.  Comments:  2/21/2020 12:49 Satish Farmer RN  auto-populated from documented surgical case  Esophagogastroduodenoscopy performed by Johnnie Velasco MD on 2/21/2020 at 84 Years.  Comments:  2/21/2020 12:49 CST - Edward WOODSON, Satish ORTIZ.  auto-populated from documented surgical case  Bleeder Control Gastrointestinal performed by Rod MONTGOMERY, Johnnie LARKIN on 2/21/2020 at 84  Years.  Comments:  2/21/2020 12:49 EAN - Satish Leon RN  auto-populated from documented surgical case  Biopsy Gastrointestinal performed by Johnnie Velasco MD on 2/21/2020 at 84 Years.  Comments:  2/21/2020 14:52 Satish Farmer RN  auto-populated from documented surgical case  Biopsy Gastrointestinal performed by Kevan Limon MD on 4/17/2019 at 83 Years.  Comments:  4/17/2019 16:47 JAIROT Richard Stuart RN  auto-populated from documented surgical case  Esophagogastroduodenoscopy performed by Kevan Limon MD on 4/17/2019 at 83 Years.  Comments:  4/17/2019 16:47 JAIROT Richard Stuart RN  auto-populated from documented surgical case  Cholecystectomy Laparoscopic performed by Bony Ness MD on 10/22/2018 at 83 Years.  Comments:  10/22/2018 17:21 CDT - [place] WOODSON, Tiffany T  auto-populated from documented surgical case  Sigmoidoscopy performed by Kevan Limon MD on 3/22/2018 at 82 Years.  Comments:  3/22/2018 9:33 JAIROT Makayla Deutsch RN  auto-populated from documented surgical case  Biopsy Gastrointestinal performed by Kevan Limon MD on 3/22/2018 at 82 Years.  Comments:  3/22/2018 9:33 Makayla Zeng RN  auto-populated from documented surgical case  77893 - LT CATARACT W/IOL 1 STA PHACO (Left) performed by Dov Fallon MD on 6/28/2017 at 81 Years.  Comments:  6/28/2017 8:06 CDT - Grace Overton RN  auto-populated from documented surgical case  58130 - RT CATARACT W/IOL 1 STA PHACO (Right) performed by Dov Fallon MD on 6/16/2017 at 81 Years.  Comments:  6/16/2017 8:21 CDT - Julissa Vázquez  auto-populated from documented surgical case  Hernia Repair Inguinal Bilateral (Bilateral) performed by Richard Garduno MD on 7/1/2014 at 78 Years.  Comments:  7/1/2014 9:13 CDT Kathy Ramirez RN  auto-populated from documented surgical case  gold seed implant for prstate ca.  Tonsillectomy (SNOMED CT 102836052).  angiogram  (neg).   Social History        Social & Psychosocial Habits    Alcohol  06/19/2014  Use: Current    Type: Beer    Frequency: Daily    Average drinks per episode in last year: 3    02/20/2020  Use: Current    Comment: quit - 02/20/2020 08:39 - Lupe Burnham RN    05/07/2020  Use: Past    Employment/School  06/19/2014  Status: Retired    Highest education: High school    Substance Use  06/16/2017 Risk Assessment: Denies Substance Abuse    02/20/2020  Use: Never    Tobacco  06/19/2014  Use: Former smoker    Type: Cigarettes    Comment: quit 2000, smoked x25 years - 06/19/2014 16:03 - La Nena Araiza RN    06/16/2017 Risk Assessment: Denies Tobacco Use    04/17/2019  Use: Never (less than 100 in l    Patient Wants Consult For Cessation Counseling N/A    02/20/2020  Use: Never (less than 100 in l    Patient Wants Consult For Cessation Counseling N/A    05/07/2020  Use: Former smoker, quit more    Patient Wants Consult For Cessation Counseling N/A    05/20/2020  Use: Former smoker, quit more    Patient Wants Consult For Cessation Counseling N/A    Abuse/Neglect  02/20/2020  SHX Any signs of abuse or neglect No    05/07/2020  SHX Any signs of abuse or neglect No    05/20/2020  SHX Any signs of abuse or neglect No    Feels unsafe at home: No    Safe place to go: Yes  .        Physical Examination      Vital Signs (last 24 hrs)_____  Last Charted___________  Temp Oral     37.0 DegC  (MAY 21 12:00)  Heart Rate Apical    H 106bpm  (MAY 20 17:45)  Resp Rate         20 br/min  (MAY 21 12:00)  SBP      137 mmHg  (MAY 21 12:00)  DBP      76 mmHg  (MAY 21 12:00)  SpO2      94 %  (MAY 21 13:10)  Weight      63.9 kg  (MAY 20 17:00)     General:  No acute distress.    Eye:  Extraocular movements are intact, Normal conjunctiva.    HENT:  Oral mucosa is moist, pt wearing protective helmet, s/p craniotomy with skull cap removal.    Neck:  No jugular venous distention, No lymphadenopathy.    Respiratory:  Respirations are  non-labored, Symmetrical chest wall expansion, +ronchi, trach.    Cardiovascular:  Normal rate, Regular rhythm, No edema.    Gastrointestinal:  Soft, Non-tender, Non-distended, peg.    Musculoskeletal:  Normal range of motion, b/l feet in boots.    Integumentary:  Warm, Dry, Intact.    Neurologic:  Alert.    Psychiatric:  unable to assess.       Review / Management   Laboratory Results   Today's Lab Results : PowerNote Discrete Results   5/21/2020 5:00 CDT       pH Art                    7.530  HI                             pCO2 Art                  41 mmHg                             pO2 Art                   91 mmHg                             HCO3 Art                  34.3 mmol/L  HI                             O2 Sat Art                98 %  HI                             D base                    10.6  HI                             Setting 1 ABG             SIMV                             Setting 2 ABG             PS 10                             FIO2 ABG                  30 %                             O2 Dev/Mode ABG           VENT                             Resp Rate ABG             8                             Tidal Vol ABG             500                             PEEP ABG                  5    5/21/2020 3:27 CDT       WBC                       15.7 x10(3)/mcL  HI                             RBC                       2.51 x10(6)/mcL  LOW                             Hgb                       7.3 gm/dL  LOW                             Hct                       22.5 %  LOW                             Platelet                  380 x10(3)/mcL                             MCV                       89.6 fL                             MCH                       29.1 pg                             MCHC                      32.4 gm/dL  LOW                             RDW                       14.7 %                             MPV                       10.1 fL                             Abs Neut                   14.65 x10(3)/mcL  HI                             Neutro Auto               93.1 %  HI                             Lymph Auto                2.7 %  LOW                             Mono Auto                 3.2 %  LOW                             Eos Auto                  0.1 %                             Abs Eos                   0.01 x10(3)/mcL                             Basophil Auto             0.2 %                             Abs Neutro                14.65 x10(3)/mcL  HI                             Abs Lymph                 0.42 x10(3)/mcL  LOW                             Abs Mono                  0.50 x10(3)/mcL                             Abs Baso                  0.03 x10(3)/mcL                             NRBC%                     0.0 %                             IG%                       0.700 %  HI                             IG#                       0.1100  HI                             Sodium Lvl                142 mmol/L                             Potassium Lvl             3.7 mmol/L                             Chloride                  106 mmol/L                             CO2                       31 mmol/L                             Calcium Lvl               7.7 mg/dL  LOW                             Glucose Lvl               210 mg/dL  HI                             BUN                       27.5 mg/dL  HI                             Creatinine                0.57 mg/dL  LOW                             BUN/Creat Ratio           48  NA                             eGFR-AA                   >60  NA                             eGFR-SARMAD                  >60  NA                             Prealbumin                5.5 mg/dL  LOW        Radiology results   Rad Results ()   Accession: [id number]  Order: XR Chest 1 View  Report Dt/Tm: 05/21/2020 06:07  Report:   EXAMINATION  XR Chest 1 View     INDICATION  Dyspnea     Comparison: 5/18/2020     FINDINGS  A tracheostomy remains in place. The  cardiomediastinal silhouette is  stable in size. There is obscuration of the left hemidiaphragm,  unchanged from prior radiographs. There is no new or progressive  airspace consolidation. There is no visible pneumothorax.     IMPRESSION:  Stable exam without significant interval change.          Impression and Plan       Subdural hematoma secondary to a fall   s/p craniotomy for subdural evacuation 05/08/2020, repeat craniotomy on 05/11/2020 with removal of skull cap.  Hypertension.  Acute Respiratory failure s/p trach placement  Hx of Oropharyngeal dysphagia prior peg placement  Remote history of prostate cancer.  Severe weakness and deconditioning.  Acute anemia    Plan  Pulmonary consult for vent weaning  consult therapy services  repeat labs in am  monitor h/h       Critical care time greater than 30 minutes

## 2022-04-30 NOTE — OP NOTE
Patient:   Bradly Whitney            MRN: [id number]            FIN: [id number]               Age:   81 years     Sex:  Male     :  1935   Associated Diagnoses:   None   Author:   Dov Fallon MD      Preoperative Diagnosis: Cataract Right eye    Postoperative Diagnosis: Cataract Right eye    Procedure: Phacoemulsification with intaocular lens implantations Right eye    Surgeon: Dov Fallon MD    Assistant: La Nena Temple, [facility]    Anestheisa: Topical    Complications: None    The patient was brought into the operating suite, where the patient was correctly identified as was the operative eye via timeout.  The patient was prepped and draped in a sterile ophthalmic fashion.  A lid speculum was placed in the operative eye and the microscope was brought into place.  A 1.0mm paracentesis was then made at (12) o'clock.  The anterior chamber was filled with Endocoat.  A (temporal) clear corneal incision was made with a 2.4 mm keratome.  A 6 mm corneal marking ring was used to doni the cornea centered over the visual axis.  A 5.00 mm continuous curvilinear capsulorhexis was fashioned using a cystotome and microcapsular forceps.  Hydrodissection and hydrodelineation was performed with upreserved 1% Xylocaine.  The nucleus was then phacoemulsified with the Abbott phacoemulsification hand-piece with a total of (6) EFX.  The cortex was then removed with the Paolo I/A hand-piece. An KRYSTA lens model (ZCB00) with a power of (22.5) was placed in the capsular bag.  The Helon was then removed from the eye with the Paolo I/A hand piece.  The anterior chamber was inflated and the wounds were hydrated with BSS.  The wounds were checked with Weck-Mildred sponges and found to be watertight.  The lid speculum was removed and topical antibiotics were placed on the operative eye.  The patient was brought to PACU in good condition.        Surgery Date 17 [facility] [place]

## 2022-04-30 NOTE — H&P
HISTORY OF PRESENT ILLNESS:  The patient is an 82-year-old white male, who was seen in our office by my nurse practitioner on 2/12/18 for evaluation of chronic diarrhea and weight loss.  He was scheduled for a flexible sigmoidoscopy for which he presents today.  The patient had a colonoscopy in December 2017 with Dr. Guardado finding a tubular adenoma in the rectum.  Random biopsies were not taken for microscopic colitis; therefore, a flexible sigmoidoscopy was ordered. I referred the reader to our nurse practitioner's note from 2/12/18 for further details.  In discussing the patient's symptoms, it seems as though he has more in the way of fecal incontinence.  He has a normal bowel movement in the morning and then later on has diarrhea.  He has been having episodes of fecal incontinence including incontinence at night.  Questran was recently given and this seems to have offered some benefit.  He has not had a previous cholecystectomy.    ALLERGIES:  No known drug allergies.    MEDICATIONS:  Amlodipine, benazepril, Centrum Silver, glucosamine chondroitin, ibuprofen, Imodium AD, omega3 fatty acids.    PAST MEDICAL HISTORY:    1. Hypertension.  2. History of prostate cancer.    PAST SURGICAL HISTORY:  Hernia repair.    SOCIAL HISTORY:  He is .  He is from [place].  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  He is retired.  Former smoker.  No alcohol.  He enjoys crawfishing in his long term.    PHYSICAL EXAMINATION:  VITAL SIGNS:  Stable, afebrile.   GENERAL:   Healthy appearing white male, in no acute distress.   HEENT:   Sclerae are nonicteric.  Conjunctivae are pink.  No adenopathy or thyromegaly.   CARDIOVASCULAR:  Regular rate and rhythm.   LUNGS:  Clear.   ABDOMEN:  Soft and nontender.  Positive bowel sounds.  No mass or organomegaly appreciated.   EXTREMITIES:  No clubbing, cyanosis or edema.   NEUROLOGIC:  Alert and oriented, no focal deficits.    IMPRESSION:  Chronic diarrhea and  fecal incontinence.    RECOMMENDATIONS:  Will proceed with flexible sigmoidoscopy for biopsies to exclude microscopic colitis.        ______________________________  MD ROXANNA Maloney/JAIRO  DD:  03/22/2018  Time:  08:56AM  DT:  03/22/2018  Time:  09:23AM  Job #:  [id number]    The H&P was reviewed, the patient was examined, and the following changes to the patients condition are noted:  ______________________________________________________________________________  ______________________________________________________________________________  ______________________________________________________________________________  [  ] No changes to the patient's condition:      ______________________________                                             ___________________  PHYSICIAN SIGNATURE                                                             DATE/TIME    cc: Ahmet Nash MD

## 2022-04-30 NOTE — CONSULTS
Patient:   Bradly Whitney            MRN: [id number]            FIN: [id number]               Age:   84 years     Sex:  Male     :  1935   Associated Diagnoses:   Cough; Dyspnea; History of acute respiratory failure; Increased tracheal secretions; Oral candida; S/P craniotomy   Author:   Joyce Suárez NP      Consultation Information   Consultation:  Tracheal Secretions, cough, , Ariana Laguna MD.       Basic Information   Admit information:  Admitted for continued care and anitbiotics from [facility], now on swingbed. .    Source of history:  Medical record.    Present at bedside:  Medical personnel.    Referral source:  Ariana Laguna MD.    History limitation:  None.       Chief Complaint   Copious secretions from trach, cough.       History of Present Illness   Mr. Whitney presents as an 84-year-old male who is s/p craniotomy x2 due to traumatic right subdural hematoma. He remained on the ventilated following the procedure and subsequently underwent a tracheostomy. Patient was on long term antibiotics for MRSE bacteremia. Patient has been having significant amount of secretions for the past 1-2 weeks. On  E.Coli grew on trach culture. Cipro was started with no improvement of secretions. Patient was then started on Robinul, Scopolamine patches and switched to Levaquin IV and is slowly improving. Respiratory reports he does not tolerate Mucomyst due to bronchospams. Chest x=ray shows no infiltrates. Patient denies any history of respiratory issues or smoking. We have been consulted to help with his pulmonary care.          Review of Systems   Constitutional:  Weakness, Decreased activity.    Eye:  Negative.    Ear/Nose/Mouth/Throat:  Negative.    Respiratory:  Cough, Sputum production.    Cardiovascular:  Negative.    Gastrointestinal:  Negative.    Genitourinary:  Negative.    Hematology/Lymphatics:  Negative.    Endocrine:  Negative.    Immunologic:  Negative.    Musculoskeletal:  Negative.     Integumentary:  Negative.    Neurologic:  Alert and oriented X4.    Psychiatric:  Anxiety.    All other systems are negative      Health Status   Allergies:    Allergic Reactions (All)  No Known Allergies,    Allergies (1) Active Reaction  No Known Allergies None Documented     Current medications:  (Selected)   Inpatient Medications  Ordered  Flomax 0.4 mg oral capsule: 0.4 mg, form: Cap, Oral, Daily, first dose 06/21/20 9:00:00 CDT  Hmua-Q: 1 cap(s), form: Cap, Oral, Daily, first dose 06/23/20 8:02:00 CDT  Robinul: 1 mg, form: Tab, PEG Tube, TID, Order duration: 7 day(s), first dose 06/26/20 14:00:00 CDT, stop date 07/03/20 13:59:00 CDT  acetaminophen: 650 mg, form: Tab, Oral, q6hr PRN for as needed for pain, first dose 06/29/20 10:24:00 CDT  apixaban: 10 mg, form: Tab, PEG Tube, BID, first dose 06/18/20 21:00:00 CDT  docusate sodium 10 mg/mL oral liquid: 200 mg, form: Soln, PEG Tube, At Bedtime PRN for as needed for constipation, first dose 06/28/20 8:59:00 CDT  furosemide inj. (IV Push or IM) 10 mg/mL: 20 mg, form: Injection, IV Push, BID, first dose 06/19/20 6:00:00 CDT  lansoprazole 30 mg oral tab, disintegrating (pt. own): 30 mg, form: Tab-Dis, PEG Tube, BID, first dose 06/18/20 21:00:00 CDT  levofloxacin 750 mg/150 mL intravenous solution: 750 mg, form: Infusion, IV Piggyback, q24hr, Infuse over: 1.5 hr, first dose 06/24/20 10:00:00 CDT  miconazole 2% topical powder: 1 ankur, form: Powder, TOP, BID, first dose 06/23/20 11:22:00 CDT  mirtazapine: 15 mg, form: Tab, Oral, Once a day (at bedtime), first dose 06/29/20 21:00:00 CDT  potassium chloride 20 mEq/15 mL oral liquid: 20 mEq, form: Liquid, PEG Tube, BID, first dose 06/19/20 9:00:00 CDT  scopolamine 1.5 mg transdermal film, extended release: 3 mg, form: Patch-72, TD, q72hr, first dose 06/27/20 14:24:00 CDT, 2 patches  Prescriptions  Prescribed  Eliquis 5 mg oral tablet: 5 mg = 1 tab(s), Oral, BID, # 30 tab(s), 0 Refill(s), other reason (Rx)  Lasix 20 mg  oral tablet: 20 mg = 1 tab(s), Oral, Daily, # 14 tab(s), 0 Refill(s), other reason (Rx)  Documented Medications  Documented  DuoNeb: 3 mL, NEB, q4hr, PRN PRN shortness of breath or wheezing, 0 Refill(s)  Eliquis 5 mg oral tablet: 10 mg = 2 tab(s), Oral, BID, 0 Refill(s)  Fioricet oral capsule: Oral, q4hr, PRN PRN headache, 0 Refill(s)  Questran Packets: 4 gm = 1 packet(s), Oral, TID, 0 Refill(s)  Tylenol 325 mg oral tablet: 650 mg = 2 tab(s), Oral, q4hr, PRN PRN pain, 0 Refill(s)  Vasotec 1.25 mg/mL intravenous solution: 1.25 mg = 1 mL, IV Push, q6hr, PRN PRN hypertension, 0 Refill(s)  Zofran 2 mg/mL injectable solution: 4 mg = 2 mL, IV Push, q6hr, PRN PRN nausea/vomiting, 0 Refill(s)  acetaminophen: 500 mg = 15.63 mL, PEG Tube, q4hr, PRN PRN fever, 0 Refill(s)  acetylcysteine 20% inhalation solution: 400 mg = 2 mL, NEB, q4hr Resp, PRN PRN congestion, 0 Refill(s)  amlodipine 5 mg oral tablet: 10 mg = 2 tab(s), PEG Tube, Daily, 0 Refill(s)  bisacodyl 10 mg RECTAL suppository: 10 mg = 1 supp, NH (rectal), Daily, PRN PRN other (see comment), 0 Refill(s)  docusate 10 mg/mL oral liquid: 200 mg = 20 mL, PEG Tube, Once a day (at bedtime), 0 Refill(s)  labetalol 5 mg/mL intravenous solution: 10 mg = 2 mL, IV Push, q2hr, PRN PRN hypertension, 0 Refill(s)  lansoprazole 30 mg oral tab, disintegrating (pt. own): 30 mg = 1 tab(s), PEG Tube, BID, 0 Refill(s)  traMADol 50 mg oral tablet: 50 mg = 1 tab(s), Oral, q6hr, PRN PRN pain, severe, 0 Refill(s)  vancomycin 1 g intravenous injection: 1 gm, IV Piggyback, q12hr, 0 Refill(s),    Medications (13) Active  Scheduled: (11)  apixaban 5 mg Tab  10 mg 2 tab(s), PEG Tube, BID  furosemide 10mg/ml Inj--2ml  20 mg 2 mL, IV Push, BID  glycopyrrolate 1 mg Tab  1 mg 1 tab(s), PEG Tube, TID  lactobacillus acidophilus - Cap  1 cap(s), Oral, Daily  lansoprazole 30 mg Solu-Tab UD  30 mg 1 tab(s), PEG Tube, BID  levofloxacin 750 mg/150 mL  750 mg 150 mL, IV Piggyback, q24hr  miconazole topical 2%  Pow  1 ankru, TOP, BID  mirtazapine 15 mg Tab UD  15 mg 1 tab(s), Oral, Once a day (at bedtime)  potassium chloride 20 mEq/15 mL Sol UD  20 mEq 15 mL, PEG Tube, BID  scopolamine 1.5 mg Pat  3 mg 2 patch(es), TD, q72hr  tamsulosin 0.4 mg Cap  0.4 mg 1 cap(s), Oral, Daily  Continuous: (0)  PRN: (2)  acetaminophen 325 mg Tab  650 mg 2 tab(s), Oral, q6hr  docusate 10 mg/mL Sol UD - 10mL  200 mg 20 mL, PEG Tube, At Bedtime     Problem list:    All Problems  Activity tolerance / SNOMED CT 1655777645 / Confirmed  Anorexia / SNOMED CT 596857695 / Confirmed  At risk of pressure sore / SNOMED CT 283861280 / Confirmed  Bruises easily / SNOMED CT 4683148222 / Confirmed  Cataract / SNOMED CT 055992207 / Confirmed  Diarrhea / SNOMED CT 339134865 / Confirmed  Heartburn / SNOMED CT 86204062 / Confirmed  Hernia / SNOMED CT 567141356 / Confirmed  Impaired mobility / SNOMED CT 912663543 / Confirmed / Stable  Language-related cognitive disorder / SNOMED CT 765636427 / Confirmed / Stable  Malnutrition / SNOMED CT 831869869 / Confirmed  Malnutrition / SNOMED CT 956859131 / Confirmed  Oropharyngeal dysphagia / SNOMED CT 117565347 / Confirmed / Stable  Prostate cancer / SNOMED CT [id number] / Confirmed  Total self-care deficit / SNOMED CT 942055588 / Confirmed / Stable,    Active Problems (15)  Activity tolerance   Anorexia   At risk of pressure sore   Bruises easily   Cataract   Diarrhea   Heartburn   Hernia   Impaired mobility   Language-related cognitive disorder   Malnutrition   Malnutrition   Oropharyngeal dysphagia   Prostate cancer   Total self-care deficit         Histories   Past Medical History:    Active  Cataract (170888364)  Activity tolerance (4581820931)  Hernia (164382832)  Bruises easily (5246842839)  Resolved  Wears glasses (910115902):  Resolved.  sinus allergies (6165343470):  Resolved.  Hypertension (09668508):  Resolved.  hearing loss (03440742):  Resolved.  can lie flat (689688186):   Resolved.  h/o prostate ca 2000 (0227594219):  Resolved.  Acid reflux (7930760778):  Resolved.  Arthritis (3005052):  Resolved.  can walk 2 blocks briskly w/o CP/SOB (768236972):  Resolved.   Family History:    No family history items have been selected or recorded.   Procedure history:    Tracheostomy Percutaneous on 5/18/2020 at 84 Years.  Comments:  5/18/2020 10:21 KATY KEITHT, Cathi L  auto-populated from documented surgical case  Bronchoscopy, Therapeutic on 5/18/2020 at 84 Years.  Comments:  5/18/2020 10:21 KATY KEITHT, Cathi L  auto-populated from documented surgical case  Craniotomy (None) on 5/11/2020 at 84 Years.  Comments:  5/11/2020 22:29 Faisal Lara RN  auto-populated from documented surgical case  Craniotomy Hematoma Evacuation (Right) on 5/8/2020 at 84 Years.  Comments:  5/8/2020 15:55 Bethany Dunn RN  auto-populated from documented surgical case  PEG Tube Insertion Initial on 3/2/2020 at 84 Years.  Comments:  3/2/2020 11:50 Dov Beth RN  auto-populated from documented surgical case  Upper GI Endoscopy W/Submucosal Inj on 2/21/2020 at 84 Years.  Comments:  2/21/2020 12:49 Satish Farmer RN  auto-populated from documented surgical case  Esophagogastroduodenoscopy on 2/21/2020 at 84 Years.  Comments:  2/21/2020 12:49 Satish Farmer RN  auto-populated from documented surgical case  Bleeder Control Gastrointestinal on 2/21/2020 at 84 Years.  Comments:  2/21/2020 12:49 Satish Farmer RN  auto-populated from documented surgical case  Biopsy Gastrointestinal on 2/21/2020 at 84 Years.  Comments:  2/21/2020 14:52 Satish Farmer RN  auto-populated from documented surgical case  Biopsy Gastrointestinal on 4/17/2019 at 83 Years.  Comments:  4/17/2019 16:47 Richard Choudhury RN  auto-populated from documented surgical case  Esophagogastroduodenoscopy on 4/17/2019 at 83 Years.  Comments:  4/17/2019 16:47  JAIROT - Mark WOODSON, Richard NASH  auto-populated from documented surgical case  Cholecystectomy Laparoscopic on 10/22/2018 at 83 Years.  Comments:  10/22/2018 17:21 CDT - [place] WOODSON, Tiffany BOX  auto-populated from documented surgical case  Sigmoidoscopy on 3/22/2018 at 82 Years.  Comments:  3/22/2018 9:33 CDT - Juancho WOODSON, Makayla Badillo  auto-populated from documented surgical case  Biopsy Gastrointestinal on 3/22/2018 at 82 Years.  Comments:  3/22/2018 9:33 JAIROT Laurence Dawkins RN, Makayla Badillo  auto-populated from documented surgical case  16763 - LT CATARACT W/IOL 1 STA PHACO (Left) on 6/28/2017 at 81 Years.  Comments:  6/28/2017 8:06 JAIROT Laurence Overton RN, Grace ADEN  auto-populated from documented surgical case  42493 - RT CATARACT W/IOL 1 STA PHACO (Right) on 6/16/2017 at 81 Years.  Comments:  6/16/2017 8:21 CDT - Julissa Vázquez  auto-populated from documented surgical case  Hernia Repair Inguinal Bilateral (Bilateral) on 7/1/2014 at 78 Years.  Comments:  7/1/2014 9:13 Kathy Lerner RN  auto-populated from documented surgical case  gold seed implant for prstate ca.  Tonsillectomy ([id number]).  angiogram (neg).   Social History        Social & Psychosocial Habits    Alcohol  06/19/2014  Use: Current    Type: Beer    Frequency: Daily    Average drinks per episode in last year: 3    02/20/2020  Use: Current    Comment: quit - 02/20/2020 08:39 - Lupe Burnham RN    05/07/2020  Use: Past    Employment/School  06/19/2014  Status: Retired    Highest education: High school    Substance Use  06/16/2017 Risk Assessment: Denies Substance Abuse    02/20/2020  Use: Never    Tobacco  06/19/2014  Use: Former smoker    Type: Cigarettes    Comment: quit 2000, smoked x25 years - 06/19/2014 16:03 - La Nena Araiza RN    06/16/2017 Risk Assessment: Denies Tobacco Use    04/17/2019  Use: Never (less than 100 in l    Patient Wants Consult For Cessation Counseling N/A    02/20/2020  Use: Never (less than 100 in l    Patient  Wants Consult For Cessation Counseling N/A    05/07/2020  Use: Former smoker, quit more    Patient Wants Consult For Cessation Counseling N/A    06/19/2020  Use: Former smoker, quit more    Patient Wants Consult For Cessation Counseling N/A    Abuse/Neglect  02/20/2020  SHX Any signs of abuse or neglect No    05/07/2020  SHX Any signs of abuse or neglect No    06/19/2020  SHX Any signs of abuse or neglect No  .        Physical Examination   Vital Signs   6/29/2020 7:00 CDT       Temperature Oral          36.5 DegC                             Temperature Oral (calculated)             97.70 DegF                             Peripheral Pulse Rate     70 bpm                             SpO2                      95 %                             Systolic Blood Pressure   123 mmHg                             Diastolic Blood Pressure  78 mmHg     Intake and Output   Fluid Balance Primitives   6/29/2020 8:00 CDT       PEG Abdominal wall                                       Gastric/Enteral Tube Flush:           50 mL                                 Residual Amount Total:                0 mL        General:  Alert and oriented, No acute distress.    Eye:  Pupils are equal, round and reactive to light, Normal conjunctiva.    HENT:  Normal hearing, impressive oral candida, tracheostomy present.    Neck:  No carotid bruit.    Respiratory:  Respirations are non-labored, Breath sounds are equal, expiratory ronchi.    Cardiovascular:  Normal rate, Regular rhythm.    Gastrointestinal:  Soft, Non-tender.    Genitourinary:  No costovertebral angle tenderness.    Lymphatics:  No lymphadenopathy neck, axilla, groin.    Musculoskeletal:  limited ROM.    Integumentary:  Normal, Warm.    Cognition and Speech:  Oriented, Speech clear and coherent.       Health Maintenance      Health Maintenance     Pending (in the next year)        OverDue           Advance Directive due  01/01/20  and every 1  year(s)        Due            Pneumococcal  Vaccine due  05/21/20  and every 1  day(s)           Medicare Annual Wellness Exam due  06/29/20  and every 1  year(s)           Pneumococcal Vaccine due  06/29/20  and every            Zoster Vaccine due  06/29/20  and every 100  year(s)        Due In Future            Cognitive Screening not due until  01/01/21  and every 1  year(s)           Fall Risk Assessment not due until  01/01/21  and every 1  year(s)           Functional Assessment not due until  01/01/21  and every 1  year(s)           Obesity Screening not due until  01/01/21  and every 1  year(s)           ADL Screening not due until  06/11/21  and every 1  year(s)           Hypertension Management-BMP not due until  06/25/21  and every 1  year(s)           Hypertension Management-Blood Pressure not due until  06/28/21  and every 1  year(s)     Satisfied (in the past 1 year)        Satisfied            ADL Screening on  06/11/20.           Blood Pressure Screening on  06/29/20.  Satisfied by Jared Moscoso           Body Mass Index Check on  06/26/20.  Satisfied by Felicitas ANGEL, ROSENDO, Kaila NGUYỄN.           Colorectal Screening on  05/29/20.           Diabetes Maintenance-HgbA1c on  05/26/20.           Diabetes Screening on  06/25/20.  Satisfied by Sangeetha Gardner           Fall Risk Assessment on  06/14/20.           Functional Assessment on  06/19/20.           Hypertension Management-Blood Pressure on  06/29/20.  Satisfied by Jared Moscoso           Obesity Screening on  06/29/20.  Satisfied by Oanh Kaiser CNA.           Pneumococcal Vaccine on  05/20/20.           Tetanus Vaccine on  05/08/20.  Satisfied by Emile WOODSON, Natalie Whtie          Review / Management   Chest x-ray results        Consistent with:   * Final Report *    Reason For Exam  Dyspnea    Radiology Report  CHEST 1 VIEW (PORTABLE):     HISTORY: Dyspnea  correlation with June 13, 2020              FINDINGS: Tracheostomy remains in apparent good position. There  is  persistent left basilar pleural-parenchymal opacity and lesser right  basilar opacity. Mediastinum not shifted.     IMPRESSION:  No adverse change          Signature Line  Electronically Signed By: Megha Olivares MD  Date/Time Signed: 06/22/2020 08:11      This document has an image         Result type: XR Chest 1 View  Result date: June 22, 2020 5:14 CDT  Result status: Auth (Verified)  Result title: XR Chest 1 View  Performed by: Megha Olivares MD on June 22, 2020 8:10 CDT  Verified by: Megha Olivares MD on June 22, 2020 8:11 CDT  Encounter info: [id number], [facility], Swingbed, 6/18/2020 -             Impression and Plan   Diagnosis     Cough (SOQ60-IK R05).     Dyspnea (SHV27-SA R06.00).     History of acute respiratory failure (JYI53-PK Z87.09).     Increased tracheal secretions (CNQ74-DN J39.8).     Oral candida (HUM54-DV B37.0).     S/P craniotomy (UQB76-OE Z98.890).     Course:  Mr. Whitney is currently awake, alert and in no distress. He reports some dyspnea and a cough. He is still having increased secretions requiring frequent suctioning but it appears it is thining and improving after starting Levaquin,  Robulin and continuing Scopolamine. He does have oral candida and I will begin him on Micafungin due to his inability to swallow Nystatin at present. Sputum last culture was 6/22 which was negative. I will repeat this today. He was unable to tolerate Mucomyst in the past. I will start Brovana and Spiriva. He has not had an issues with oxygentation, respirarory reported but due to recent craniotomy x2 I will order Bipap as needed. Continue current anitbiotics and lasix. Keep I < O.       Thank you for allowing us to take part in Mr. Whitney's care. We will continue to follow him along with you. .    Orders     Orders   Laboratory:  Sputum Culture w/ Gram Stain (Order)  Culture Respiratory (Order): Routine collect, 6/29/2020 13:01 CDT, Sputum, Nurse collect  Gram Stain (Order):  Routine collect, 6/29/2020 13:01 CDT, Sputum, Nurse collect  Pharmacy:  Spiriva Respimat 2.5 mcg/inh inhalation aerosol (Order): 2 inh, form: Aerosol, INH, Daily, first dose 6/29/2020 13:02 CDT  Brovana (Order): 2 mL, form: Soln-Inh, NEB, BID, first dose 6/29/2020 13:02 CDT  micafungin (for IVPB) (Order): 100 mg, form: Injection, IV Piggyback, Daily, first dose 6/30/2020 9:00 CDT.

## 2022-05-03 NOTE — HISTORICAL [facility]
This is a historical note converted from Jessica. Formatting and pictures may have been removed.  Please reference Jessica for original formatting and attached multimedia. History of Present Illness  An 84-year-old patient who fell, struck his head, developed subdural hematoma and underwent craniotomy on 05/08/2020, and necessitated a redo craniotomy with skull cap removal on 05/11/2020 for recurrence of the subdural hematoma. ?The patient has undergone tracheostomy and had a previous PEG tube placed 2 months ago because of oropharyngeal dysphagia to support his nutritional intake. ?The patient has tolerated trach collar and has been transferred to the LTAC.  ?   Consultation to pulmonary has?been placed.? He continues to tolerate trach collar with no respiratory distress.  Review of Systems  Patient is intubated and sedate; therefore, review of systems are not obtainable.  Physical Exam  Vitals & Measurements  T:?36.8? ?C (Oral)? TMIN:?36.7? ?C (Oral)? TMAX:?36.8? ?C (Oral)? HR:?118(Monitored)? RR:?32(Spontaneous)? BP:?137/68? SpO2:?94%? WT:?63.9?kg?  VITAL SIGNS: ?Reviewed as listed  GENERAL:? Patient is awake and alert, no distress.?  HEAD:? No signs of head trauma.  EYES:? Pupils are equal.? Extraocular motions intact.?  EARS: Are normal in appearance with adequate hearing  MOUTH:? Oropharynx is normal.? No erythema or thrush  NECK:? No adenopathy, no JVD.?? Tracheostomy in place  CHEST: Lungs are CTA with good airflow bilateral.? No wheeze or rale.??  CARDIAC:? Regular rate and rhythm.? S1 and S2, no active murmur  VASCULAR:? No Edema.? Peripheral pulses normal and equal in all extremities.  ABDOMEN:? Soft, without tenderness.? No distention,? rebound or guarding, and no masses palpated.?? Bowel Sounds normal.  MUSCULOSKELETAL:? Extremities without clubbing, cyanosis or edema.? Full range of motion  NEUROLOGIC EXAM: Patient is awake but not really cooperative or appropriate.? Does appear that he has range of  motion.  PSYCHIATRIC: Unable to determine.  SKIN:? No rash or lesions.  ?  Assessment/Plan  IMPRESSION:??  ?1.??Subdural hematoma secondary to a fall when leaning over to open a faucet, with craniotomy for subdural evacuation 05/08/2020, followed by redo on 05/11/2020 with removal of skull cap.  ?2.??Hypertension.  ?3.??Respiratory failure secondary to above.? Now tolerating trach collar for several days  ?4.??Oropharyngeal dysphagia with previous percutaneous endoscopic gastrostomy tube placed 2 months prior to this hospitalization.  ?5.??Remote history of prostate cancer.  ?6.??Protein-calorie malnutrition.  ?7.??Severe weakness and deconditioning.  ?  PLAN:??  -Continue trach collar low-flow oxygen?support?and do not expect?he will need?mechanical ventilation support.  -Continue tracheostomy?until he shows?that his airway is more?secure.  -LTAC therapy?per protocol  -Nutritional support via PEG tube  -DVT prophylaxis with mechanical SCDs   Problem List/Past Medical History  Ongoing  Activity tolerance  Anorexia  Bruises easily  Cataract  Diarrhea  Heartburn  Hernia  Prostate cancer  Historical  Acid reflux  Arthritis  can lie flat  can walk 2 blocks briskly w/o CP/SOB  h/o prostate ca 2000  hearing loss  Hypertension  sinus allergies  Wears glasses  Procedure/Surgical History  Bronchoscopy, Therapeutic (05/18/2020)  Tracheostomy Percutaneous (05/18/2020)  Craniotomy (None) (05/11/2020)  Craniotomy Hematoma Evacuation (Right) (05/08/2020)  Insertion of Feeding Device into Stomach, Percutaneous Approach (03/02/2020)  PEG Tube Insertion Initial (03/02/2020)  Biopsy Gastrointestinal (02/21/2020)  Bleeder Control Gastrointestinal (02/21/2020)  Control Bleeding in Gastrointestinal Tract, Via Natural or Artificial Opening Endoscopic (02/21/2020)  Esophagogastroduodenoscopy (02/21/2020)  Upper GI Endoscopy W/Submucosal Inj (02/21/2020)  Biopsy Gastrointestinal (04/17/2019)  Esophagogastroduodenoscopy  (04/17/2019)  Esophagogastroduodenoscopy, flexible, transoral; with biopsy, single or multiple (04/17/2019)  Excision of Duodenum, Via Natural or Artificial Opening Endoscopic, Diagnostic (04/17/2019)  Excision of Stomach, Via Natural or Artificial Opening Endoscopic, Diagnostic (04/17/2019)  Cholecystectomy Laparoscopic (10/22/2018)  Fluoroscopy of Gallbladder and Bile Ducts using Other Contrast (10/22/2018)  Inspection of Gallbladder, Percutaneous Endoscopic Approach (10/22/2018)  Resection of Gallbladder, Open Approach (10/22/2018)  Biopsy Gastrointestinal (03/22/2018)  Excision of Descending Colon, Via Natural or Artificial Opening Endoscopic, Diagnostic (03/22/2018)  Excision of Sigmoid Colon, Via Natural or Artificial Opening Endoscopic, Diagnostic (03/22/2018)  Sigmoidoscopy (03/22/2018)  Sigmoidoscopy, flexible; with biopsy, single or multiple (03/22/2018)  94609 - LT CATARACT W/IOL 1 STA PHACO (Left) (06/28/2017)  Extracapsular cataract removal with insertion of intraocular lens prosthesis (1 stage procedure), manual or mechanical technique (eg, irrigation and aspiration or phacoemulsification) (06/28/2017)  Replacement of Left Lens with Synthetic Substitute, Percutaneous Approach (06/28/2017)  37776 - RT CATARACT W/IOL 1 STA PHACO (Right) (06/16/2017)  Extracapsular cataract removal with insertion of intraocular lens prosthesis (1 stage procedure), manual or mechanical technique (eg, irrigation and aspiration or phacoemulsification) (06/16/2017)  Replacement of Right Lens with Synthetic Substitute, Percutaneous Approach (06/16/2017)  Hernia Repair Inguinal Bilateral (Bilateral) (07/01/2014)  Injection or infusion of other therapeutic or prophylactic substance (07/01/2014)  Other and open bilateral repair of direct inguinal hernia with graft or prosthesis (07/01/2014)  Other and open repair of indirect inguinal hernia with graft or prosthesis (07/01/2014)  Repair initial inguinal hernia, age 5 years or  older; reducible. (07/01/2014)  angiogram (neg)  gold seed implant for prstate ca  Tonsillectomy   Medications  Inpatient  acetaminophen, 500 mg= 15.63 mL, PEG Tube, q4hr, PRN  amlodipine 5 mg oral tablet, 10 mg= 1 tab(s), PEG Tube, Daily  APAP/butalbital/caffeine 325 mg-50 mg-40 mg oral tablet, 1 tab(s), Oral, q4hr, PRN  bisacodyl 10 mg RECTAL suppository, 10 mg= 1 supp, MI (rectal), Daily, PRN  docusate 10 mg/mL oral liquid, 200 mg= 20 mL, PEG Tube, Once a day (at bedtime)  labetalol 5 mg/mL intravenous solution, 10 mg= 2 mL, IV Push, q2hr, PRN  Protonix 40 mg Vial (IV Push)  Sodium Chloride 0.9% Normal Saline Flush, 10 mL, IV Push, q12hr  traMADol 50 mg oral tablet, 50 mg= 1 tab(s), Oral, q6hr, PRN  Tylenol 325 mg oral tablet, 650 mg= 2 tab(s), Oral, q4hr, PRN  Vasotec 1.25 mg/mL intravenous solution, 1.25 mg= 1 mL, IV Push, q6hr, PRN  Zofran 2 mg/mL injectable solution, 4 mg= 2 mL, IV Push, q6hr, PRN  Home  acetaminophen, PEG Tube, q4hr, PRN  amlodipine 5 mg oral tablet, 10 mg= 2 tab(s), PEG Tube, Daily  bisacodyl 10 mg RECTAL suppository, 10 mg= 1 supp, MI (rectal), Daily, PRN  docusate 10 mg/mL oral liquid, 200 mg= 20 mL, PEG Tube, Once a day (at bedtime)  Fioricet oral capsule, Oral, q4hr, PRN  labetalol 5 mg/mL intravenous solution, 10 mg= 2 mL, IV Push, q2hr, PRN  morphine 1 mg/mL preservative-free injectable solution, IV, q4hr, PRN  pantoprazole, 40 mg, IV Push, Daily  traMADol 50 mg oral tablet, 50 mg= 1 tab(s), Oral, q6hr, PRN  Tylenol, 650 mg= 1 supp, MI (rectal), q4hr, PRN  Tylenol 325 mg oral tablet, 650 mg= 2 tab(s), Oral, q4hr, PRN  Vasotec 1.25 mg/mL intravenous solution, 1.25 mg= 1 mL, IV Push, q6hr, PRN  Zofran 2 mg/mL injectable solution, 4 mg= 2 mL, IV Push, q6hr, PRN  Allergies  No Known Allergies  Social History  Abuse/Neglect  No, No, Yes, 05/20/2020  No, 05/07/2020  No, 02/20/2020  Alcohol  Past, 05/07/2020  Current, 02/20/2020  Current, Beer, Daily, 3 drinks/episode average.,  06/19/2014  Employment/School  Retired, Highest education level: High school., 06/19/2014  Substance Use - Denies Substance Abuse, 06/16/2017  Never, 02/20/2020  Tobacco - Denies Tobacco Use, 06/16/2017  Former smoker, quit more than 30 days ago, N/A, 05/20/2020  Former smoker, quit more than 30 days ago, N/A, 05/07/2020  Never (less than 100 in lifetime), N/A, 02/20/2020  Never (less than 100 in lifetime), N/A, 04/17/2019  Former smoker, Cigarettes, 06/19/2014  Immunizations  Vaccine Date Status Comments   tetanus-diphtheria toxoids 05/08/2020 Given Med Not Available

## 2022-05-03 NOTE — HISTORICAL [facility]
This is a historical note converted from [employer]. Formatting and pictures may have been removed.  Please reference [employer] for original formatting and attached multimedia. Admit and Discharge Dates  Admit Date: 06/18/2020  Discharge Date: 07/16/2020  Physicians  Attending Physician - Jase MONTGOMERY, Ariana  Admitting Physician - Jase MONTGOMERY, Ariana  Consulting Physician - Khoa Valerio MD, Rickie ORTIZ  Consulting Physician - Ángela MONTGOMERY, Jori NGUYỄN  Primary Care Physician - Saad MONTGOMERY, Ahmet CASTAÑEDA  Discharge Diagnosis  Cough?R05  Dyspnea?R06.00  History of acute respiratory failure?Z87.09  Increased tracheal secretions?J39.8  Oral candida?B37.0  S/P craniotomy?Z98.890  ???Subdural hematoma status post craniotomy x2  ??Chronic encephalopathy  ??Chronic respiratory failure with tracheostomy  ??Recently treated tracheitis  ??Recently treated MRSE bacteremia completed vancomycin  ??Severe deconditioning  ??Dysphagia on PEG tube feedings  ??Anemia of chronic disease  ??Bilateral pulmonary emboli/LLE DVT  ??Oral candidiasis.  ??Sacral decubiti , refer to [facility] notes. [1]  Surgical Procedures  No procedures recorded for this visit.  Immunizations  No immunizations recorded for this visit.  Admission Information  Patient is a 85 yo WM who has Hx of Esophagitis, gastritis,,?history of duodenal ulcer,?history of duodenal ulcer,?oropharyngeal dysphagia with PEG tube,?patient had a fall at home and developed?right side subdural hematoma?right-sided subdural hematoma, he presented to local ER?several days after and was found to have acute on chronic right subdural hematoma patient was transferred to [facility] where he had right-sided craniotomy ?2?initially he had bone placed back and he had to be removed since the patient continued to have?altered mental status and?altered mental status and?obtunded, mental status improved after 2nd surgery,?patient remains ventilator dependent and required tracheostomy,?he is currently on trach collar, he also was found  to have MRSMELISA bacteremia?seen by infectious disease recommendation?is long-term vancomycin that will be completed on 06/25/2020,?patient developed bilateral pulmonary emboli?patient developed bilateral pulmonary emboli?currently on Eliquis, he has had significant trach secretions and unable to get PMV placed, patient is able to follow commands and elevate arms and legs symmetrically. Context: traumatic SDH. Mod Fxs: none, Accompanying S&S: UTO. [2]  Hospital Course  This is an 84-year-old WM who has a history of chronic dysphagia with PEG tube placement, had a fall at home and developed subdural hematoma patient required craniotomy x2, he was admitted to [facility] for rehabilitation unfortunately patient has not progressed significantly, he has chronic respiratory failure with tracheostomy that was placed after the surgery, patient has continued to have significant tracheal secretions although they are improved compared with admission here, he was treated for E. coli tracheitis, he also has been on several anti-secretion medications, after 2 weeks of therapy and no improvement family decided to take him home with hospice.  ?  7/15/20. patient remains about the same , going home soon with hospice.  7/16/20.Patient remains with intermittent confusion, no meaningful improvement, he will be discharged home with Hospice.  ?   Pulmonary Consult:  ??Cough (GSG50-RS R05). ??  ??Dyspnea (GKF97-DA R06.00). ??  ??History of acute respiratory failure (CPE47-QP Z87.09). ??  ??Increased tracheal secretions (OYG95-WH J39.8). ??  ??Oral candida (KVP93-AX B37.0). ??  ??S/P craniotomy (HRV18-AY Z98.890). ??  ??Course: ?Mr. Whitney is currently awake, alert and in no distress. He reports some dyspnea and a cough. He is still having increased secretions requiring frequent suctioning but it appears it is thining and improving after starting Levaquin, ?Robulin and continuing Scopolamine. He does have oral candida and I will begin him on  Micafungin due to his inability to swallow Nystatin at present. Sputum last culture was 6/22 which was negative. I will repeat this today. He was unable to tolerate Mucomyst in the past. I will start Brovana and Spiriva. He has not had an issues with oxygentation, respirarory reported but due to recent craniotomy x2 I will order Bipap as needed. Continue current anitbiotics and lasix. Keep I < O.? [3]  Significant Findings  (06/22/2020 05:14 CDT XR Chest 1 View)  FINDINGS: Tracheostomy remains in apparent good position. There is  persistent left basilar pleural-parenchymal opacity and lesser right  basilar opacity. Mediastinum not shifted.  ?  IMPRESSION:  No adverse change [4]  ?  (06/30/2020 08:47 CDT XR Abdomen KUB 1 View)  adiology Report  EXAMINATION  XR Abdomen KUB 1 View  ?  INDICATION  Abdominal pain, constipation  ?  Comparison: There are no comparisons.  ?  FINDINGS  Lines/tubes/devices:  Left upper quadrant percutaneous gastrostomy is noted.  ?  Detection of air-fluid levels and low-volume pneumoperitoneum is  limited due to technique.  Notable amount of residual stool is present throughout the colon.  A non-obstructed bowel gas pattern is present. No intra-abdominal mass  effect is appreciated.?  Surgical changes project over the pelvis bilaterally. Prostatic  radiotherapy beads are incidentally visualized.  ?  Included osseous structures are without acute abnormality. Regional  degenerative changes are incidentally noted.  ?  IMPRESSION  1. ?No evidence of acute or focal intra-abdominal abnormality.  2. ?Findings in keeping with constipation.?  3. ?Additional incidental, nonacute details discussed above.  ? [5]  Time Spent on discharge  35 min.  Objective  Vitals & Measurements  T:?36.4? ?C (Axillary)? TMIN:?36.4? ?C (Axillary)? TMAX:?36.5? ?C (Axillary)? HR:?67(Peripheral)? RR:?20? BP:?98/52? SpO2:?99%?  Physical Exam  ??General: ?No acute distress. ?  ??Respiratory: ?scattered ronchi B/L.  ?  ??Cardiovascular: ?Regular rhythm, Good pulses equal in all extremities. ?  ??Gastrointestinal: ?Soft, Non-tender, Normal bowel sounds. ?  ??Musculoskeletal: ?MS 3/5, bedbound. ?  ??Neurologic: ?Alert, Normal deep tendon reflexes. ?  ??Psychiatric: ?Cooperative. ?  ??? [6]  Patient Discharge Condition  Fair.  Discharge Disposition  Home with Hospice   Discharge Medication Reconciliation  Prescribed  QUEtiapine (Seroquel 25 mg oral tablet)?25 mg, PEG Tube, qPM  albuterol (albuterol 0.083% inhalation solution)?2.5 mg, NEB, q6hr, PRN as needed for wheezing  apixaban (apixaban 5 mg oral tablet)?5 mg, Oral, BID  arformoterol (Brovana 15 mcg/2 mL inhalation solution)?15 mcg, NEB, BID  furosemide (Lasix 20 mg oral tablet)?20 mg, PEG Tube, Daily  guaiFENesin (guaifenesin 100 mg/5 mL oral liquid)?600 mg, PEG Tube, QID  lactobacillus acidophilus (Huma-Q oral capsule)?1 cap(s), Oral, Daily  lansoprazole (lansoprazole 30 mg oral tab, disintegrating (pt. own))?30 mg, PEG Tube, BID  miconazole topical (miconazole 2% topical powder)?1 ankur, TOP, BID  scopolamine (scopolamine 1.5 mg transdermal film, extended release)?3 mg, TD, q72hr  tamsulosin (Flomax 0.4 mg oral capsule)?0.4 mg, Oral, Daily  traMADol (traMADol 50 mg oral tablet)?25 mg, PEG Tube, q6hr, PRN pain, mild  Continue  acetaminophen?500 mg, PEG Tube, q4hr, PRN fever  Discontinue  acetaminophen (Tylenol 325 mg oral tablet)?650 mg, Oral, q4hr, PRN pain  acetaminophen/butalbital/caffeine (Fioricet oral capsule), Oral, q4hr, PRN headache  acetylcysteine (acetylcysteine 20% inhalation solution)?400 mg, NEB, q4hr Resp, PRN congestion  albuterol-ipratropium (DuoNeb)?3 mL, NEB, q4hr, PRN shortness of breath or wheezing  amLODIPine (amlodipine 5 mg oral tablet)?10 mg, PEG Tube, Daily  apixaban (Eliquis 5 mg oral tablet)?5 mg, Oral, BID  bisacodyl (bisacodyl 10 mg RECTAL suppository)?10 mg, HI (rectal), Daily, PRN other (see comment)  cholestyramine (Questran Packets)?4 gm,  Oral, TID  docusate (docusate 10 mg/mL oral liquid)?200 mg, PEG Tube, Once a day (at bedtime)  enalapril (Vasotec 1.25 mg/mL intravenous solution)?1.25 mg, IV Push, q6hr, PRN hypertension  furosemide (Lasix 20 mg oral tablet)?20 mg, Oral, Daily  labetalol (labetalol 5 mg/mL intravenous solution)?10 mg, IV Push, q2hr, PRN hypertension  lansoprazole (lansoprazole 30 mg oral tab, disintegrating (pt. own))?30 mg, PEG Tube, BID  ondansetron (Zofran 2 mg/mL injectable solution)?4 mg, IV Push, q6hr, PRN nausea/vomiting  traMADol (traMADol 50 mg oral tablet)?50 mg, Oral, q6hr, PRN pain, severe  vancomycin (vancomycin 1 g intravenous injection)?1 gm, IV Piggyback, q12hr  Education and Orders Provided  Subdural Hematoma  Craniotomy, Care After, Easy-to-Read  Hypertension, Adult  How to Suction a Tracheostomy Tube, Adult  How to Clean a Tracheostomy Tube, Adult  PEG Tube Home Guide, Easy-to-Read  Preventing Pressure Injuries  Pressure Injury  Anemia  Deconditioning  Discharge - 07/16/20 8:52:00 CDT, Hospice Home, Give all scheduled vaccinations prior to discharge.?  Discharge Activity - Activity as Tolerated?  Discharge Diet - Other:, tube feedings?  Follow up  Ahmet Nash, within 1 week  Lucia Green on 09/10/2020  [facility] will see follow patient after discharge PHONE #[phone]     [1]?Progress Note, Acute Care; Ariana Laguna MD 07/15/2020 13:49 CDT  [2]?Admission H & P; Ariana Laguna MD 06/19/2020 16:24 CDT  [3]?Pulmonary Consultation; Joyce Suárez NP 06/29/2020 12:44 CDT  [4]?XR Chest 1 View; Megha Olivares MD 06/22/2020 05:14 CDT  [5]?XR Abdomen KUB 1 View; Colt Alex MD 06/30/2020 08:47 CDT  [6]?Progress Note, Acute Care; Ariana Laguna MD 07/15/2020 13:49 CDT

## 2022-05-03 NOTE — HISTORICAL [facility]ER
This is a historical note converted from [employer]. Formatting and pictures may have been removed.  Please reference Jessica for original formatting and attached multimedia. 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 patient was transferred to [facility] where he had right-sided craniotomy ?2?initially he had bone placed back and he had to be removed since the patient continued to have?altered mental status and?altered mental status and?obtunded, mental status improved after 2nd surgery,?patient remains ventilator dependent and required tracheostomy,?he is currently on trach collar, he also was found to have MRSE bacteremia?seen by infectious disease recommendation?is long-term vancomycin that will be completed on 06/25/2020,?patient developed bilateral pulmonary emboli?patient developed bilateral pulmonary emboli?currently on Eliquis, he has had significant trach secretions and unable to get PMV placed, patient is able to follow commands and elevate arms and legs symmetrically. Context: traumatic SDH. Mod Fxs: none, Accompanying S&S: UTO.  Review of Systems  UTO  Physical Exam  Vitals & Measurements  T:?36.5? ?C (Temporal Artery)? TMIN:?36.2? ?C (Temporal Artery)? TMAX:?36.6? ?C (Temporal Artery)? HR:?78(Peripheral)? RR:?18? BP:?154/79? SpO2:?98%? BMI:?17.38?  ?  General: ?Alert, awake ?and oriented, No acute distress. ?  Eye: ?Pupils are equal, round and reactive to light, Normal conjunctiva. ?  HENT: ?Normocephalic, Normal hearing. ?  Neck: ?Supple, Non-tender, No carotid bruit. ?trach with clear secretions.  Respiratory:??B/L ronchi  Cardiovascular: ?Regular  rhythm, No gallop, Good pulses equal in all extremities, No edema. ?  Gastrointestinal: ?Soft, Non-tender, Non-distended. ???  Genitourinary: ?normal external genitalia.  Musculoskeletal: ?Normal range of motion, MS 2/5 LE, 3/5 UE. ?  Integumentary: ?Warm, Dry. ?  Neurologic: ?Alert,Awake, able to follow commands, Normal motor function, No focal deficits.Normal DTR and sensation.CN 2-12 wnl. ?  Cognition and Speech: ?Oriented, Speech clear and coherent. ?  Psychiatric:??gets easily frustrated  ?  Assessment/Plan  Orders:  apixaban, 10 mg, form: Tab, PEG Tube, BID, first dose 06/18/20 21:00:00 CDT  docusate, 200 mg, form: Soln, PEG Tube, At Bedtime, first dose 06/18/20 21:00:00 CDT  furosemide, 20 mg, form: Injection, IV Push, BID, first dose 06/19/20 6:00:00 CDT  lansoprazole, 30 mg, form: Tab-Dis, PEG Tube, BID, first dose 06/18/20 21:00:00 CDT  potassium chloride, 20 mEq, form: Liquid, PEG Tube, BID, first dose 06/19/20 9:00:00 CDT  vancomycin, IV Piggyback, q12hr, Stop date 06/25/20 13:59:00 CDT, Physician Stop, 06/19/20 14:00:00 CDT  Consult Case Management, 06/18/20 20:19:00 CDT  Consult to Nutritionist Adult, 06/18/20 20:19:00 CDT, assessment  Consult Wound Care Nurse, 06/19/20 7:06:00 CDT, Sacrum Wound  Intake and Output, 06/18/20 20:19:00 CDT, Wayne MONTGOMERY to Nurse, 06/18/20 20:19:00 CDT, Obtain old medical record  MD to Nurse, 06/18/20 16:34:00 CDT, Humidifier air through trach collar  MD to Nurse, 06/18/20 16:34:00 CDT, Keep huff  MD to Nurse, 06/18/20 16:37:00 CDT, wear helmet when out of bed  Oxygen Therapy, 06/18/20 16:29:00 CDT, Trach Collar, CM Oxygen  Physical Therapy Eval and Treat, 06/18/20 20:19:00 CDT, Mobility, Patient has IV, Patient on O2, Standard Precautions  Resuscitation Status, 06/18/20 20:19:00 CDT, Do Not Resuscitate  Tube Feeding, 06/18/20 20:19:00 CDT, Vital AF 1.2 RTH, 70 ccs/hour, 150 q 4h  Vancomycin Level Trough, Timed collect, 06/20/20 13:00:00 CDT, Blood, Stop date 06/20/20  13:00:00 CDT, Lab Collect, 06/20/20 13:00:00 CDT  Vital Signs, 06/18/20 20:19:00 CDT, q4hr  Wound Care, 06/18/20 16:36:00 CDT, Every other day, clean wound with cleanser, apply medhoney and cover with foam dressing  ?  ?  Assessment and plan:  ?  Fall at home with head trauma and right subdural hematoma status post craniotomy x2  Acute respiratory failure, patient remainsAcute respiratory failure, patient remained?ventilator dependent and required trach, currently on trach collar  Acute on chronic anemia due to blood loss, hemoglobin is stable  Bilateral pulmonary emboli  esophagitis/Gastritis  MRSE bacteriemia  Chronic OPdysphagia with PEG tube  Chronic diarrhea  Prostate cancer.  ?  Plan:  Eliquis  Vancomycin  Wean O2  Scopolamine patch trial  PPI.  PMV when able to  PT/OT/ST eval  Nutritionist eval  ?  ?   Problem List/Past Medical History  Ongoing  Activity tolerance  Anorexia  Bruises easily  Cataract  Diarrhea  Heartburn  Hernia  Prostate cancer  Historical  Acid reflux  Arthritis  can lie flat  can walk 2 blocks briskly w/o CP/SOB  h/o prostate ca 2000  hearing loss  Hypertension  sinus allergies  Wears glasses  Procedure/Surgical History  Bronchoscopy, Therapeutic (05/18/2020)  Bypass Trachea to Cutaneous with Tracheostomy Device, Percutaneous Approach (05/18/2020)  Drainage of Right Lower Lobe Bronchus, Via Natural or Artificial Opening Endoscopic (05/18/2020)  Drainage of Right Lower Lung Lobe, Via Natural or Artificial Opening Endoscopic (05/18/2020)  Drainage of Right Main Bronchus, Via Natural or Artificial Opening Endoscopic (05/18/2020)  Tracheostomy Percutaneous (05/18/2020)  Drainage of Cerebral Ventricle with Drainage Device, Open Approach (05/12/2020)  Release Brain, Open Approach (05/12/2020)  Craniotomy (None) (05/11/2020)  Craniotomy Hematoma Evacuation (Right) (05/08/2020)  Extirpation of Matter from Intracranial Subdural Space, Open Approach (05/08/2020)  Insertion of Endotracheal Airway into  Trachea, Via Natural or Artificial Opening (05/07/2020)  Respiratory Ventilation, 24-96 Consecutive Hours (05/07/2020)  Insertion of Feeding Device into Stomach, Percutaneous Approach (03/02/2020)  PEG Tube Insertion Initial (03/02/2020)  Biopsy Gastrointestinal (02/21/2020)  Bleeder Control Gastrointestinal (02/21/2020)  Control Bleeding in Gastrointestinal Tract, Via Natural or Artificial Opening Endoscopic (02/21/2020)  Esophagogastroduodenoscopy (02/21/2020)  Upper GI Endoscopy W/Submucosal Inj (02/21/2020)  Biopsy Gastrointestinal (04/17/2019)  Esophagogastroduodenoscopy (04/17/2019)  Esophagogastroduodenoscopy, flexible, transoral; with biopsy, single or multiple (04/17/2019)  Excision of Duodenum, Via Natural or Artificial Opening Endoscopic, Diagnostic (04/17/2019)  Excision of Stomach, Via Natural or Artificial Opening Endoscopic, Diagnostic (04/17/2019)  Cholecystectomy Laparoscopic (10/22/2018)  Fluoroscopy of Gallbladder and Bile Ducts using Other Contrast (10/22/2018)  Inspection of Gallbladder, Percutaneous Endoscopic Approach (10/22/2018)  Resection of Gallbladder, Open Approach (10/22/2018)  Biopsy Gastrointestinal (03/22/2018)  Excision of Descending Colon, Via Natural or Artificial Opening Endoscopic, Diagnostic (03/22/2018)  Excision of Sigmoid Colon, Via Natural or Artificial Opening Endoscopic, Diagnostic (03/22/2018)  Sigmoidoscopy (03/22/2018)  Sigmoidoscopy, flexible; with biopsy, single or multiple (03/22/2018)  08718 - LT CATARACT W/IOL 1 STA PHACO (Left) (06/28/2017)  Extracapsular cataract removal with insertion of intraocular lens prosthesis (1 stage procedure), manual or mechanical technique (eg, irrigation and aspiration or phacoemulsification) (06/28/2017)  Replacement of Left Lens with Synthetic Substitute, Percutaneous Approach (06/28/2017)  87512 - RT CATARACT W/IOL 1 STA PHACO (Right) (06/16/2017)  Extracapsular cataract removal with insertion of intraocular lens prosthesis (1  stage procedure), manual or mechanical technique (eg, irrigation and aspiration or phacoemulsification) (06/16/2017)  Replacement of Right Lens with Synthetic Substitute, Percutaneous Approach (06/16/2017)  Hernia Repair Inguinal Bilateral (Bilateral) (07/01/2014)  Injection or infusion of other therapeutic or prophylactic substance (07/01/2014)  Other and open bilateral repair of direct inguinal hernia with graft or prosthesis (07/01/2014)  Other and open repair of indirect inguinal hernia with graft or prosthesis (07/01/2014)  Repair initial inguinal hernia, age 5 years or older; reducible. (07/01/2014)  angiogram (neg)  gold seed implant for prstate ca  Tonsillectomy   Medications  Inpatient  apixaban, 10 mg= 4 tab(s), PEG Tube, BID  docusate sodium 10 mg/mL oral liquid, 200 mg= 20 mL, PEG Tube, At Bedtime  furosemide inj. (IV Push or IM) 10 mg/mL, 20 mg= 2 mL, IV Push, BID  lansoprazole 30 mg oral tab, disintegrating (pt. own), 30 mg= 1 tab(s), PEG Tube, BID  potassium chloride 20 mEq/15 mL oral liquid, 20 mEq= 15 mL, PEG Tube, BID  vancomycin  Home  acetaminophen, 500 mg= 15.63 mL, PEG Tube, q4hr, PRN  acetylcysteine 20% inhalation solution, 400 mg= 2 mL, NEB, q4hr Resp, PRN  amlodipine 5 mg oral tablet, 10 mg= 2 tab(s), PEG Tube, Daily  bisacodyl 10 mg RECTAL suppository, 10 mg= 1 supp, NJ (rectal), Daily, PRN  docusate 10 mg/mL oral liquid, 200 mg= 20 mL, PEG Tube, Once a day (at bedtime)  DuoNeb, 3 mL, NEB, q4hr, PRN  Eliquis 5 mg oral tablet, 10 mg= 2 tab(s), Oral, BID  Eliquis 5 mg oral tablet, 5 mg= 1 tab(s), Oral, BID,? ?Not Taking per Prescriber  Fioricet oral capsule, Oral, q4hr, PRN  labetalol 5 mg/mL intravenous solution, 10 mg= 2 mL, IV Push, q2hr, PRN  lansoprazole 30 mg oral tab, disintegrating (pt. own), 30 mg= 1 tab(s), PEG Tube, BID  Lasix 20 mg oral tablet, 20 mg= 1 tab(s), Oral, Daily,? ?Investigating  Questran Packets, 4 gm= 1 packet(s), Oral, TID  traMADol 50 mg oral tablet, 50 mg= 1  tab(s), Oral, q6hr, PRN  Tylenol 325 mg oral tablet, 650 mg= 2 tab(s), Oral, q4hr, PRN  vancomycin 1 g intravenous injection, 1 gm, IV Piggyback, q12hr  Vasotec 1.25 mg/mL intravenous solution, 1.25 mg= 1 mL, IV Push, q6hr, PRN  Zofran 2 mg/mL injectable solution, 4 mg= 2 mL, IV Push, q6hr, PRN  Allergies  No Known Allergies  Social History  Abuse/Neglect  No, 06/19/2020  No, No, Yes, 05/20/2020  No, 05/07/2020  No, 02/20/2020  Alcohol  Past, 05/07/2020  Current, 02/20/2020  Current, Beer, Daily, 3 drinks/episode average., 06/19/2014  Employment/School  Retired, Highest education level: High school., 06/19/2014  Substance Use - Denies Substance Abuse, 06/16/2017  Never, 02/20/2020  Tobacco - Denies Tobacco Use, 06/16/2017  Former smoker, quit more than 30 days ago, N/A, 06/19/2020  Former smoker, quit more than 30 days ago, N/A, 05/20/2020  Former smoker, quit more than 30 days ago, N/A, 05/07/2020  Never (less than 100 in lifetime), N/A, 02/20/2020  Never (less than 100 in lifetime), N/A, 04/17/2019  Former smoker, Cigarettes, 06/19/2014  Immunizations  Vaccine Date Status Comments   tetanus-diphtheria toxoids 05/08/2020 Given Med Not Available       Stage III pressure ulcer sacral area, Wound care eval, cont medhoney from prior facility